# Patient Record
Sex: FEMALE | Race: BLACK OR AFRICAN AMERICAN | Employment: FULL TIME | URBAN - METROPOLITAN AREA
[De-identification: names, ages, dates, MRNs, and addresses within clinical notes are randomized per-mention and may not be internally consistent; named-entity substitution may affect disease eponyms.]

---

## 2018-02-21 ENCOUNTER — HOSPITAL ENCOUNTER (EMERGENCY)
Age: 28
Discharge: HOME | End: 2018-02-21
Attending: EMERGENCY MEDICINE | Admitting: EMERGENCY MEDICINE
Payer: COMMERCIAL

## 2018-02-21 VITALS
TEMPERATURE: 98.1 F | SYSTOLIC BLOOD PRESSURE: 114 MMHG | HEART RATE: 71 BPM | OXYGEN SATURATION: 97 % | DIASTOLIC BLOOD PRESSURE: 59 MMHG | WEIGHT: 189.4 LBS | RESPIRATION RATE: 15 BRPM | HEIGHT: 63 IN | BODY MASS INDEX: 33.56 KG/M2

## 2018-02-21 DIAGNOSIS — L73.9 FOLLICULITIS: Primary | ICD-10-CM

## 2018-02-21 PROCEDURE — 99282 EMERGENCY DEPT VISIT SF MDM: CPT

## 2018-02-21 RX ORDER — MUPIROCIN 20 MG/G
OINTMENT TOPICAL 3 TIMES DAILY
Qty: 22 G | Refills: 0 | Status: SHIPPED | OUTPATIENT
Start: 2018-02-21 | End: 2018-03-03

## 2018-02-21 NOTE — LETTER
[facility] EMERGENCY DEPT 
[address] [facility] [address] [phone] Work/School Note Date: 2/21/2018 To Whom It May concern: 
 
Imelda Duran was seen and treated today in the emergency room by the following provider(s): 
Attending Provider: Arabella Pinedo MD. Imelda Duran {Return to school/sport/work:[id number]} Sincerely, 
 
 
 
 
Kesha Lopez RN

## 2018-02-21 NOTE — LETTER
[facility] EMERGENCY DEPT 
[address] [facility] [address] [phone] Work/School Note Date: 2/21/2018 To Whom It May concern: 
 
Manda Kearney was seen and treated today in the emergency room by the following provider(s): 
Attending Provider: Dawna Robert MD. Manda Kearney may return to work on 2/21/18. Sincerely, Ivett Hernadez MD

## 2018-02-21 NOTE — ED NOTES
Work excuse note clarified with dr Harsh Razo md-pt to return tomorrow (after 24hrs of abx)-work note updated. .. Héctor Anaya Puls Discharge summary and discharge medications reviewed with patient and appropriate educational materials and side effects teaching were provided. patient  Given 1 paper prescriptions and 0 electronic prescriptions sent to pt's listed pharmacy. Patient (s) verbalized understanding of the importance of discussing medications with his or her physician or clinic they will be following up with. No si/s of acute distress prior to discharge. Patient offered wheelchair from treatment area to hospital entrance, patient refused wheelchair. Denied pain. Pt given work excuse note. ..  Emergency Department Nursing Plan of Care       The Nursing Plan of Care is developed from the Nursing assessment and Emergency Department Attending provider initial evaluation. The plan of care may be reviewed in the ED Provider note.     The Plan of Care was developed with the following considerations:   Patient / Family readiness to learn indicated by:verbalized understanding and appropriate questions asked  Persons(s) to be included in education: patient  Barriers to Learning/Limitations:No    Signed     Elie Essex, RN    2/21/2018   1:28 PM

## 2018-02-21 NOTE — ED PROVIDER NOTES
EMERGENCY DEPARTMENT HISTORY AND PHYSICAL EXAM      Date: 2/21/2018  Patient Name: Artur Miner    History of Presenting Illness     Chief Complaint   Patient presents with    Rash     x3 days to face and neck. [employer] team members have staph, rash going around        History Provided By: Patient    HPI: Artur Miner, 32 y.o. female with no significant PMHx, presents ambulatory to the ED with cc of a rash which developed on her cheek and posterior neck which started 3 days ago. 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 She reports mild associated pain. Her pain is described as a soreness. Pt is currently on birth control. She denies allergies to medications. Pt is otherwise without complaint. PCP: Not On File Bsh[facility]    There are no other complaints, changes, or physical findings at this time. Current Outpatient Prescriptions   Medication Sig Dispense Refill    mupirocin (BACTROBAN) 2 % ointment Apply  to affected area three (3) times daily for 10 days. Apply to affected area 22 g 0    ondansetron (ZOFRAN ODT) 4 mg disintegrating tablet Take 1 Tab by mouth every eight (8) hours as needed for Nausea for up to 6 doses. 6 Tab 0    loperamide (IMODIUM A-D) 2 mg tablet Take 1 Tab by mouth three (3) times daily as needed for Diarrhea for up to 6 doses. 6 Tab 0       Past History     Past Medical History:  No past medical history on file. Past Surgical History:  No past surgical history on file. Family History:  No family history on file. Social History:  Social History   Substance Use Topics    Smoking status: Never Smoker    Smokeless tobacco: Not on file    Alcohol use No       Allergies:  No Known Allergies      Review of Systems   Review of Systems   Constitutional: Negative for fever. HENT: Negative for sore throat. Eyes: Negative for photophobia and redness. Respiratory: Negative for shortness of breath and wheezing.     Cardiovascular: Negative for chest pain and leg swelling. Gastrointestinal: Negative for abdominal pain, blood in stool, nausea and vomiting. Genitourinary: Negative for difficulty urinating, dysuria, hematuria, menstrual problem and vaginal bleeding. Musculoskeletal: Negative for back pain and joint swelling. Skin: Positive for rash (face and neck). Neurological: Negative for dizziness, seizures, syncope, speech difficulty, weakness, numbness and headaches. Hematological: Negative for adenopathy. Psychiatric/Behavioral: Negative for agitation, confusion and suicidal ideas. The patient is not nervous/anxious. Physical Exam   Physical Exam   Constitutional: She is oriented to person, place, and time. She appears well-developed and well-nourished. No distress. HENT:   Head: Normocephalic and atraumatic. Mouth/Throat: Oropharynx is clear and moist. No oropharyngeal exudate. Eyes: Conjunctivae and EOM are normal. Pupils are equal, round, and reactive to light. Left eye exhibits no discharge. Neck: Normal range of motion. Neck supple. No JVD present. Cardiovascular: Normal rate, regular rhythm, normal heart sounds and intact distal pulses. Pulmonary/Chest: Effort normal and breath sounds normal. No respiratory distress. She has no wheezes. Abdominal: Soft. Bowel sounds are normal. She exhibits no distension. There is no tenderness. There is no rebound and no guarding. Musculoskeletal: Normal range of motion. She exhibits no edema or tenderness. Lymphadenopathy:     She has no cervical adenopathy. Neurological: She is alert and oriented to person, place, and time. She has normal reflexes. No cranial nerve deficit. Skin: Skin is warm and dry. Rash noted. Pustular rash on cheek and posterior neck. Psychiatric: She has a normal mood and affect. Her behavior is normal.   Nursing note and vitals reviewed. Medical Decision Making   I am the first provider for this patient.     I reviewed the vital signs, available nursing notes, past medical history, past surgical history, family history and social history. Vital Signs-Reviewed the patient's vital signs. Patient Vitals for the past 12 hrs:   Temp Pulse Resp BP SpO2   02/21/18 1256 98.1 °F (36.7 °C) 71 15 114/59 97 %     Records Reviewed: Nursing Notes and Old Medical Records    Provider Notes (Medical Decision Making):   DDx: folliculitis, skin infection     ED Course:   Initial assessment performed. The patients presenting problems have been discussed, and they are in agreement with the care plan formulated and outlined with them. I have encouraged them to ask questions as they arise throughout their visit. Disposition:  DISCHARGE NOTE  1:10 PM  The patient has been re-evaluated and is ready for discharge. Reviewed available results with patient. Counseled patient on diagnosis and care plan. Patient has expressed understanding, and all questions have been answered. Patient agrees with plan and agrees to follow up as recommended, or return to the ED if their symptoms worsen. Discharge instructions have been provided and explained to the patient, along with reasons to return to the ED. PLAN:  1. Current Discharge Medication List      START taking these medications    Details   mupirocin (BACTROBAN) 2 % ointment Apply  to affected area three (3) times daily for 10 days. Apply to affected area  Qty: 22 g, Refills: 0           2. Follow-up Information     Follow up With Details Comments Contact Info    Not On File Bshsi   Not On File (62) Patient has a PCP but that physician is not listed in [address] [place] [address]. [facility] EMERGENCY DEPT In 1 week  [facility]  [phone]        Return to ED if worse     Diagnosis     Clinical Impression:   1. Folliculitis        Attestations:    Attestation Note:  This note is prepared by ROBIN Arevalo, acting as Scribe for MD Ivett Bryan MD: The scribe's documentation has been prepared under my direction and personally reviewed by me in its entirety. I confirm that the note above accurately reflects all work, treatment, procedures, and medical decision making performed by me.

## 2018-02-21 NOTE — DISCHARGE INSTRUCTIONS
Folliculitis: Care Instructions  Your Care Instructions    Folliculitis (say \"voh-YKE-rzz-LY-tus\") is an infection of the pouches (follicles) in the skin where hair grows. It can occur on any part of the body, but it is most common on the scalp, face, armpits, and groin. Bacteria, such as those found in a hot tub, can cause folliculitis. Folliculitis begins as a red, tender area near a strand of hair. The skin can itch or burn and may drain pus or blood. Sometimes folliculitis can lead to more serious skin infections. Your doctor usually can treat mild folliculitis with an antibiotic cream or ointment. If you have folliculitis on your scalp, you may use a shampoo that kills bacteria. Antibiotics you take as pills can treat infections deeper in the skin. For stubborn cases of folliculitis, laser treatment may be an option. Laser treatment uses strong beams of light to destroy the hair follicle. But hair will no longer grow in the treated area. Follow-up care is a key part of your treatment and safety. Be sure to make and go to all appointments, and call your doctor if you are having problems. It's also a good idea to know your test results and keep a list of the medicines you take. How can you care for yourself at home? · Take your medicine exactly as prescribed. If your doctor prescribed antibiotics, take them as directed. Do not stop taking them just because you feel better. You need to take the full course of antibiotics. · Use a soap that kills bacteria to wash the infected area. If your scalp or beard is infected, use a shampoo with selenium or propylene glycol. Be careful. Do not scrub too long or too hard. · Mix 1 1/3 cup warm water and 1 tablespoon vinegar. Soak a cloth in the mixture, and place it over the infected skin until it cools off (usually 5 to 10 minutes). You can do this 3 to 6 times a day. · Do not share your razor, towel, or washcloth. That can spread folliculitis.   · Use a new blade in your razor each time you shave to keep from re-infecting your skin. · If you tend to get folliculitis, avoid using hot tubs. They can contain bacteria that cause folliculitis. When should you call for help? Call your doctor now or seek immediate medical care if:  ? · You have symptoms of infection, such as:  ¨ Increased pain, swelling, warmth, or redness. ¨ Red streaks leading from the area. ¨ Pus draining from the area. ¨ A fever. ? Watch closely for changes in your health, and be sure to contact your doctor if:  ? · You do not get better as expected. Where can you learn more? Go to http://jer-codey.info/. Enter M257 in the search box to learn more about \"Folliculitis: Care Instructions. \"  Current as of: October 13, 2016  Content Version: 11.4  © 8324-0249 [employer]. Care instructions adapted under license by [employer] (which disclaims liability or warranty for this information). If you have questions about a medical condition or this instruction, always ask your healthcare professional. [address] any warranty or liability for your use of this information.

## 2019-03-28 ENCOUNTER — OFFICE VISIT (OUTPATIENT)
Dept: INTERNAL MEDICINE CLINIC | Age: 29
End: 2019-03-28

## 2019-03-28 VITALS
BODY MASS INDEX: 43.05 KG/M2 | HEART RATE: 82 BPM | TEMPERATURE: 97.9 F | HEIGHT: 63 IN | DIASTOLIC BLOOD PRESSURE: 71 MMHG | WEIGHT: 243 LBS | OXYGEN SATURATION: 95 % | RESPIRATION RATE: 17 BRPM | SYSTOLIC BLOOD PRESSURE: 117 MMHG

## 2019-03-28 DIAGNOSIS — Z00.00 ADULT GENERAL MEDICAL EXAMINATION: Primary | ICD-10-CM

## 2019-03-28 DIAGNOSIS — N91.2 AMENORRHEA: ICD-10-CM

## 2019-03-28 DIAGNOSIS — Z13.228 SCREENING FOR ENDOCRINE, NUTRITIONAL, METABOLIC AND IMMUNITY DISORDER: ICD-10-CM

## 2019-03-28 DIAGNOSIS — Z13.29 SCREENING FOR ENDOCRINE, NUTRITIONAL, METABOLIC AND IMMUNITY DISORDER: ICD-10-CM

## 2019-03-28 DIAGNOSIS — F32.1 DEPRESSION, MAJOR, SINGLE EPISODE, MODERATE (HCC): ICD-10-CM

## 2019-03-28 DIAGNOSIS — N92.6 MENSTRUAL IRREGULARITY: ICD-10-CM

## 2019-03-28 DIAGNOSIS — R20.2 PARESTHESIA: ICD-10-CM

## 2019-03-28 DIAGNOSIS — Z13.21 SCREENING FOR ENDOCRINE, NUTRITIONAL, METABOLIC AND IMMUNITY DISORDER: ICD-10-CM

## 2019-03-28 DIAGNOSIS — Z11.3 SCREENING EXAMINATION FOR STD (SEXUALLY TRANSMITTED DISEASE): ICD-10-CM

## 2019-03-28 DIAGNOSIS — Z13.0 SCREENING FOR ENDOCRINE, NUTRITIONAL, METABOLIC AND IMMUNITY DISORDER: ICD-10-CM

## 2019-03-28 DIAGNOSIS — Z13.220 SCREENING FOR LIPID DISORDERS: ICD-10-CM

## 2019-03-28 DIAGNOSIS — E66.01 OBESITY, MORBID (HCC): ICD-10-CM

## 2019-03-28 NOTE — PROGRESS NOTES
Pt is here for Chief Complaint Patient presents with  New Patient  
  herer to establish care  Complete Physical  
 Depression Pt denies pain at this time 1. Have you been to the ER, urgent care clinic since your last visit? Hospitalized since your last visit? No 
 
2. Have you seen or consulted any other health care providers outside of the [address] [place] [address] since your last visit? Include any pap smears or colon screening.  No

## 2019-03-28 NOTE — PROGRESS NOTES
Romain Ledesma is a 29 y.o. female and presents with New Patient (herer to establish care); Complete Physical; and Depression Subjective: 
Romain Ledesma is a 29 y.o. female presenting for annual checkup and to establish care. ROS: Feeling well. No dyspnea or chest pain on exertion. No abdominal pain, change in bowel habits, black or bloody stools. No urinary tract or prostatic symptoms. No neurological complaints. Specific concerns today: mood disorder. Menstrual irregularity, LMP December 2018, had Norplant with spotting, then used NuvaRing x 1 month in December to help stop bleeding. Discussed ADVANCED DIRECTIVE:yes Advanced Directive on File: no 
Last BM: yesterday, Crisp# 4. Averages 5 BMs every 7 days. Dental exam in past 12 months: no 
Eye exam in past 12-24 months: yes Patient is seen for new onset of depression symptoms for past few months. Feels family is acting strange, everyone is strange, and she is NOT working. No treatments currently. No psychiatry or psychotherapy care, prefers talk therapy. No treatments tried, staying to herself more, not hanging or doing much. The patient denies recurrent thoughts of death and suicidal thoughts without plan. The patient experiences the following side effects from the treatment: n/a. 
3 most recent PHQ Screens 3/28/2019 Little interest or pleasure in doing things More than half the days Feeling down, depressed, irritable, or hopeless Nearly every day Total Score PHQ 2 5 Trouble falling or staying asleep, or sleeping too much Nearly every day Feeling tired or having little energy Several days Poor appetite, weight loss, or overeating Nearly every day Feeling bad about yourself - or that you are a failure or have let yourself or your family down More than half the days Trouble concentrating on things such as school, work, reading, or watching TV More than half the days Moving or speaking so slowly that other people could have noticed; or the opposite being so fidgety that others notice Not at all Thoughts of being better off dead, or hurting yourself in some way Several days PHQ 9 Score 17 How difficult have these problems made it for you to do your work, take care of your home and get along with others Not difficult at all Review of Systems Constitutional: negative for fevers, chills, anorexia and weight loss Eyes:   negative for visual disturbance, drainage, and irritation ENT:   negative for tinnitus,sore throat,nasal congestion,ear pain,and hoarseness Respiratory:  negative for cough, hemoptysis, dyspnea, and wheezing CV:   negative for chest pain, palpitations, and lower extremity edema GI:   +intermittent diarrhea 3x weekly. negative for nausea, vomiting, abdominal pain, and melena Endo:               negative for polyuria,polydipsia,polyphagia, and heat intolerance Genitourinary: negative for frequency, urgency, dysuria, retention, and hematuria Integument:  negative for rash, ulcerations, and pruritus Hematologic:  negative for easy bruising and bleeding Musculoskel: +tingling and numbness to fingers and toes, advised to wear wrist splints with some relief. negative for muscle weakness,and joint pain/swelling Neurological:  negative for headaches, dizziness, vertigo,and memory/gait problems Behavl/Psych: negative for feelings of anxiety, suicide, and mood changes Past Medical History:  
Diagnosis Date  Depression History reviewed. No pertinent surgical history. Social History Socioeconomic History  Marital status: SINGLE Spouse name: Not on file  Number of children: Not on file  Years of education: Not on file  Highest education level: Not on file Tobacco Use  Smoking status: Never Smoker  Smokeless tobacco: Never Used Substance and Sexual Activity  Alcohol use: Yes Comment: on occ  Drug use:  No  
  Sexual activity: Yes Birth control/protection: None History reviewed. No pertinent family history. No Known Allergies Objective: 
Visit Vitals /71 (BP 1 Location: Right arm, BP Patient Position: Sitting) Pulse 82 Temp 97.9 °F (36.6 °C) (Oral) Resp 17 Ht 5' 3\" (1.6 m) Wt 243 lb (110.2 kg) LMP 12/12/2018 (Approximate) SpO2 95% BMI 43.05 kg/m² Wt Readings from Last 3 Encounters:  
03/28/19 243 lb (110.2 kg) 02/21/18 189 lb 6.4 oz (85.9 kg) 12/07/15 215 lb (97.5 kg) Physical Exam:  
General appearance - alert, well appearing, and in no distress. Mental status - A/O x 4, normal mood and flat affect. Poor eye contact most of visit with head down and headphones on during interview portion. Head/Eyes- AT/NC. CINTHYA, EOMI, corneas normal, no foreign bodies. Ears- TM intact bilaterally, no erythema or drainage. Nose- Septum midline, pink mucosa. Turbinates normal, no polyps or erythema. No sinus tenderness. Mouth/Throat - mucous membranes moist, pharynx normal without lesions. No tonsillar swelling or exudates. Neck -Supple ,normal CSP. FROM, non-tender. No adenopathy/thyromegaly. No JVD. Chest - CTA. Symmetric chest rise. No wheezing, rales or rhonchi. Heart - Normal rate, regular rhythm. Normal S1, S2. No MGR. Abdomen - Soft, obese, non-distended. Normoactive BS in all quadrants. NT, no mass, rebound, or HSM Ext- Radial, DP pulses, 2+ bilaterally. No pedal edema, clubbing, or cyanosis. Skin- Normal for ethnicity, warm, and dry. No hyperpigmentation, ulcerations, or suspicious lesions Neuro - Normal speech, no focal findings. Normal strength and muscle tone. Coordination and gait normal.   
CN II-XII intact. Cold and vibratory sensation intact. Normal DTR's. Assessment/Plan: I spent greater than 50% of 40 minute visit counseling patient about diagnostic results, impressions, prognosis, risk/benefits of treatment options, medication management and adequate follow-up, importance of adherence to treatment plan, and risk factor reduction. Discussed the patient's BMI with her. The BMI follow up plan is as follows: exercise 5 x weekly and eating 5 x daily. I have reviewed/discussed the above normal BMI (Body mass index is 43.05 kg/m².) with the patient. I have recommended the following interventions: encourage exercise, monitor weight, and dietary management education, guidance, and counseling, . Medication Side Effects and Warnings were discussed with patient: yes Patient Labs were reviewed: yes Patient Past Records were reviewed: yes See orders below Pt has given consent verbally while in office for [employer]. ICD-10-CM ICD-9-CM 1. Adult general medical examination R30.86 Y81.1 METABOLIC PANEL, COMPREHENSIVE  
   CBC WITH AUTOMATED DIFF  
   HEMOGLOBIN A1C WITH EAG  
   LIPID PANEL  
   TSH 3RD GENERATION 2. Obesity, morbid ([facility] [address] E66.01 278.01 HEMOGLOBIN A1C WITH EAG 3. Screening for lipid disorders Z13.220 V77.91 LIPID PANEL 4. Screening for endocrine, nutritional, metabolic and immunity disorder J64.70 Z16.07 METABOLIC PANEL, COMPREHENSIVE  
 Z13.21  CBC WITH AUTOMATED DIFF  
 Z13.228  HEMOGLOBIN A1C WITH EAG  
 Z13.0  TSH 3RD GENERATION 5. Depression, major, single episode, moderate (HCC) F32.1 296.22 CT/NG/T.VAGINALIS AMPLIFICATION  
   REFERRAL TO PSYCHOLOGY 6. Paresthesia R20.2 782.0 MAGNESIUM 7. Amenorrhea N91.2 626.0 AMB POC URINE PREGNANCY TEST, VISUAL COLOR COMPARISON 8. Screening examination for STD (sexually transmitted disease) Z11.3 V74.5 9. Menstrual irregularity N92.6 626.4 Orders Placed This Encounter  METABOLIC PANEL, COMPREHENSIVE  
 CBC WITH AUTOMATED DIFF  
 HEMOGLOBIN A1C WITH EAG  
 LIPID PANEL  
 TSH 3RD GENERATION  
 CT/NG/T.VAGINALIS AMPLIFICATION Order Specific Question:   Specimen type Answer:   Urine [258]  MAGNESIUM  
  REFERRAL TO PSYCHOLOGY Referral Priority:   Routine Referral Type:   Behavioral Health Referral Reason:   Specialty Services Required Referred to Provider:   Tani Parra LCSW Number of Visits Requested:   1  AMB POC URINE PREGNANCY TEST, VISUAL COLOR COMPARISON Follow-up and Dispositions · Return in about 1 month (around 4/25/2019) for mdd, lab review. Vicky Renteria expressed understanding of plan. An After Visit Summary was offered/printed and given to the patient.

## 2019-03-28 NOTE — PATIENT INSTRUCTIONS
Eat Breakfast DAILY within 30-60 minutes of WAKING and AIM to eat at least 5 TIMES DAILY, 3 meals and 2 snacks, about 2-3 hours apart. Aim to drink 122 OUNCES of water DAILY. Equal to HALF of your bodyweight. May add FRUIT, cucumbers, lemon, etc. For flavoring. Avoid SODA and JUICE. Start walking 3 TIMES WEEKLY for 20 minutes, as you get more comfortable increase your PACE and then the amount of TIME. The goal is 5 days weekly for 30 minutes. Also look up THE FAST METABOLISM DIET on [employer] by Destini Plascencia to review. There is also an FARIHA. Learning About Positive Thinking What is positive thinking? Positive thinking, or healthy thinking, is a way to help you stay well or cope with a health problem by changing how you think. It's based on research that shows that you can change how you think. And how you think affects how you feel. Cognitive-behavioral therapy, also called CBT, is a therapy that is often used to help people think in a healthy way. It focuses on thought (cognitive) and action (behavioral). How can positive thinking help you? If you think in a positive way, you may be more able to care for yourself and handle life's challenges. You will feel better. And you may be more able to avoid or cope with stress, anxiety, and depression. CBT may be able to help you sleep better and lose weight. How can you get started with positive thinking? CBT involves techniques that you can practice every day so that healthy thinking comes naturally. Here are the steps for one technique. 1. Stop. When you notice a negative thought, stop it in its tracks and write it down. 2. Ask. Look at that thought and ask yourself whether it is helpful or unhelpful right now. 3. Choose. Choose a new, helpful thought to replace a negative one. Here's an example of how this might work: 
· In a job review, your boss praised several things about your work.  But you're feeling down because she had one small criticism. You might even think, \"I'm no good at my job\" or \"She doesn't like me. I must be bad. \" These are negative thoughts. You want to stop them. · Ask yourself questions about the situation and your negative thoughts. You might ask, \"What did my boss say exactly? \" \"Were there positive comments? \" \"Why do I focus only on one criticism? \" Your answers can help you find more accurate and helpful statements. · Now choose a helpful thought to replace the negative thoughts. For example, you might think, \"I've done a lot of good work this year, and my boss noticed it. She thought there was one area I can improve. So I'll think of some things I can do to get stronger in that area. \" With time and practice, you can learn to see that the harsh things you say to yourself may keep you from enjoying your life and work. You can replace them with more helpful thoughts. Where can you learn more? Go to http://jer-codey.info/. Enter D488 in the search box to learn more about \"Learning About Positive Thinking. \" Current as of: September 11, 2018 Content Version: 11.9 © 6866-0699 [employer]. Care instructions adapted under license by [employer] (which disclaims liability or warranty for this information). If you have questions about a medical condition or this instruction, always ask your healthcare professional. [place] [address] any warranty or liability for your use of this information. Advance Directives: Care Instructions Your Care Instructions An advance directive is a legal way to state your wishes at the end of your life. It tells your family and your doctor what to do if you can no longer say what you want. There are two main types of advance directives. You can change them any time that your wishes change.  
· A living will tells your family and your doctor your wishes about life support and other treatment. · A durable power of  for health care lets you name a person to make treatment decisions for you when you can't speak for yourself. This person is called a health care agent. If you do not have an advance directive, decisions about your medical care may be made by a doctor or a  who doesn't know you. It may help to think of an advance directive as a gift to the people who care for you. If you have one, they won't have to make tough decisions by themselves. Follow-up care is a key part of your treatment and safety. Be sure to make and go to all appointments, and call your doctor if you are having problems. It's also a good idea to know your test results and keep a list of the medicines you take. How can you care for yourself at home? · Discuss your wishes with your loved ones and your doctor. This way, there are no surprises. · Many states have a unique form. Or you might use a universal form that has been approved by many states. This kind of form can sometimes be completed and stored online. Your electronic copy will then be available wherever you have a connection to the Internet. In most cases, doctors will respect your wishes even if you have a form from a different state. · You don't need a  to do an advance directive. But you may want to get legal advice. · Think about these questions when you prepare an advance directive: 
? Who do you want to make decisions about your medical care if you are not able to? Many people choose a family member or close friend. ? Do you know enough about life support methods that might be used? If not, talk to your doctor so you understand. ? What are you most afraid of that might happen? You might be afraid of having pain, losing your independence, or being kept alive by machines. ? Where would you prefer to die? Choices include your home, a hospital, or a nursing home. ? Would you like to have information about hospice care to support you and your family? ? Do you want to donate organs when you die? ? Do you want certain Zoroastrian practices performed before you die? If so, put your wishes in the advance directive. · Read your advance directive every year, and make changes as needed. When should you call for help? Be sure to contact your doctor if you have any questions. Where can you learn more? Go to http://jer-codey.info/. Enter R264 in the search box to learn more about \"Advance Directives: Care Instructions. \" Current as of: April 18, 2018 Content Version: 11.9 © 3568-5382 [employer]. Care instructions adapted under license by [employer] (which disclaims liability or warranty for this information). If you have questions about a medical condition or this instruction, always ask your healthcare professional. [address] any warranty or liability for your use of this information.

## 2020-05-26 ENCOUNTER — HOSPITAL ENCOUNTER (INPATIENT)
Age: 30
LOS: 1 days | Discharge: HOME | DRG: 751 | End: 2020-05-27
Attending: EMERGENCY MEDICINE | Admitting: PSYCHIATRY & NEUROLOGY
Payer: MEDICAID

## 2020-05-26 DIAGNOSIS — F20.0 PARANOID SCHIZOPHRENIA (HCC): Primary | ICD-10-CM

## 2020-05-26 DIAGNOSIS — Z00.8 MEDICAL CLEARANCE FOR PSYCHIATRIC ADMISSION: ICD-10-CM

## 2020-05-26 PROBLEM — F33.3 DEPRESSION, MAJOR, RECURRENT, SEVERE WITH PSYCHOSIS (HCC): Status: ACTIVE | Noted: 2020-05-26

## 2020-05-26 LAB
ALBUMIN SERPL-MCNC: 3.9 G/DL (ref 3.5–5)
ALBUMIN/GLOB SERPL: 1.1 {RATIO} (ref 1.1–2.2)
ALP SERPL-CCNC: 53 U/L (ref 45–117)
ALT SERPL-CCNC: 17 U/L (ref 12–78)
AMPHET UR QL SCN: NEGATIVE
ANION GAP SERPL CALC-SCNC: 7 MMOL/L (ref 5–15)
APPEARANCE UR: CLEAR
AST SERPL-CCNC: 14 U/L (ref 15–37)
BARBITURATES UR QL SCN: NEGATIVE
BASOPHILS # BLD: 0 K/UL (ref 0–0.1)
BASOPHILS NFR BLD: 0 % (ref 0–1)
BENZODIAZ UR QL: NEGATIVE
BILIRUB SERPL-MCNC: 0.3 MG/DL (ref 0.2–1)
BILIRUB UR QL: NEGATIVE
BUN SERPL-MCNC: 10 MG/DL (ref 6–20)
BUN/CREAT SERPL: 10 (ref 12–20)
CALCIUM SERPL-MCNC: 8.9 MG/DL (ref 8.5–10.1)
CANNABINOIDS UR QL SCN: NEGATIVE
CHLORIDE SERPL-SCNC: 103 MMOL/L (ref 97–108)
CO2 SERPL-SCNC: 30 MMOL/L (ref 21–32)
COCAINE UR QL SCN: NEGATIVE
COLOR UR: NORMAL
CREAT SERPL-MCNC: 1.02 MG/DL (ref 0.55–1.02)
DIFFERENTIAL METHOD BLD: NORMAL
DRUG SCRN COMMENT,DRGCM: NORMAL
EOSINOPHIL # BLD: 0.1 K/UL (ref 0–0.4)
EOSINOPHIL NFR BLD: 2 % (ref 0–7)
ERYTHROCYTE [DISTWIDTH] IN BLOOD BY AUTOMATED COUNT: 13.8 % (ref 11.5–14.5)
ETHANOL SERPL-MCNC: <10 MG/DL
GLOBULIN SER CALC-MCNC: 3.6 G/DL (ref 2–4)
GLUCOSE SERPL-MCNC: 85 MG/DL (ref 65–100)
GLUCOSE UR STRIP.AUTO-MCNC: NEGATIVE MG/DL
HCG UR QL: NEGATIVE
HCT VFR BLD AUTO: 39.4 % (ref 35–47)
HGB BLD-MCNC: 12.4 G/DL (ref 11.5–16)
HGB UR QL STRIP: NEGATIVE
IMM GRANULOCYTES # BLD AUTO: 0 K/UL (ref 0–0.04)
IMM GRANULOCYTES NFR BLD AUTO: 0 % (ref 0–0.5)
KETONES UR QL STRIP.AUTO: NEGATIVE MG/DL
LEUKOCYTE ESTERASE UR QL STRIP.AUTO: NEGATIVE
LYMPHOCYTES # BLD: 2.9 K/UL (ref 0.8–3.5)
LYMPHOCYTES NFR BLD: 42 % (ref 12–49)
MCH RBC QN AUTO: 26.3 PG (ref 26–34)
MCHC RBC AUTO-ENTMCNC: 31.5 G/DL (ref 30–36.5)
MCV RBC AUTO: 83.7 FL (ref 80–99)
METHADONE UR QL: NEGATIVE
MONOCYTES # BLD: 0.5 K/UL (ref 0–1)
MONOCYTES NFR BLD: 8 % (ref 5–13)
NEUTS SEG # BLD: 3.4 K/UL (ref 1.8–8)
NEUTS SEG NFR BLD: 48 % (ref 32–75)
NITRITE UR QL STRIP.AUTO: NEGATIVE
NRBC # BLD: 0 K/UL (ref 0–0.01)
NRBC BLD-RTO: 0 PER 100 WBC
OPIATES UR QL: NEGATIVE
PCP UR QL: NEGATIVE
PH UR STRIP: 6.5 [PH] (ref 5–8)
PLATELET # BLD AUTO: 246 K/UL (ref 150–400)
PMV BLD AUTO: 12 FL (ref 8.9–12.9)
POTASSIUM SERPL-SCNC: 3.6 MMOL/L (ref 3.5–5.1)
PROT SERPL-MCNC: 7.5 G/DL (ref 6.4–8.2)
PROT UR STRIP-MCNC: NEGATIVE MG/DL
RBC # BLD AUTO: 4.71 M/UL (ref 3.8–5.2)
SODIUM SERPL-SCNC: 140 MMOL/L (ref 136–145)
SP GR UR REFRACTOMETRY: <1.005 (ref 1–1.03)
UROBILINOGEN UR QL STRIP.AUTO: 0.2 EU/DL (ref 0.2–1)
WBC # BLD AUTO: 7 K/UL (ref 3.6–11)

## 2020-05-26 PROCEDURE — 81003 URINALYSIS AUTO W/O SCOPE: CPT

## 2020-05-26 PROCEDURE — 36415 COLL VENOUS BLD VENIPUNCTURE: CPT

## 2020-05-26 PROCEDURE — 85025 COMPLETE CBC W/AUTO DIFF WBC: CPT

## 2020-05-26 PROCEDURE — 80053 COMPREHEN METABOLIC PANEL: CPT

## 2020-05-26 PROCEDURE — 90791 PSYCH DIAGNOSTIC EVALUATION: CPT

## 2020-05-26 PROCEDURE — 80307 DRUG TEST PRSMV CHEM ANLYZR: CPT

## 2020-05-26 PROCEDURE — 99284 EMERGENCY DEPT VISIT MOD MDM: CPT

## 2020-05-26 PROCEDURE — 65220000003 HC RM SEMIPRIVATE PSYCH

## 2020-05-26 PROCEDURE — 81025 URINE PREGNANCY TEST: CPT

## 2020-05-26 RX ORDER — DIPHENHYDRAMINE HYDROCHLORIDE 50 MG/ML
50 INJECTION, SOLUTION INTRAMUSCULAR; INTRAVENOUS
Status: DISCONTINUED | OUTPATIENT
Start: 2020-05-26 | End: 2020-05-27 | Stop reason: HOSPADM

## 2020-05-26 RX ORDER — HYDROXYZINE 25 MG/1
50 TABLET, FILM COATED ORAL
Status: DISCONTINUED | OUTPATIENT
Start: 2020-05-26 | End: 2020-05-27 | Stop reason: HOSPADM

## 2020-05-26 RX ORDER — BENZTROPINE MESYLATE 1 MG/1
1 TABLET ORAL
Status: DISCONTINUED | OUTPATIENT
Start: 2020-05-26 | End: 2020-05-27 | Stop reason: HOSPADM

## 2020-05-26 RX ORDER — ACETAMINOPHEN 325 MG/1
650 TABLET ORAL
Status: DISCONTINUED | OUTPATIENT
Start: 2020-05-26 | End: 2020-05-27 | Stop reason: HOSPADM

## 2020-05-26 RX ORDER — ADHESIVE BANDAGE
30 BANDAGE TOPICAL DAILY PRN
Status: DISCONTINUED | OUTPATIENT
Start: 2020-05-26 | End: 2020-05-27 | Stop reason: HOSPADM

## 2020-05-26 RX ORDER — HALOPERIDOL 5 MG/ML
5 INJECTION INTRAMUSCULAR
Status: DISCONTINUED | OUTPATIENT
Start: 2020-05-26 | End: 2020-05-27 | Stop reason: HOSPADM

## 2020-05-26 RX ORDER — LORAZEPAM 1 MG/1
1 TABLET ORAL
Status: DISPENSED | OUTPATIENT
Start: 2020-05-26 | End: 2020-05-26

## 2020-05-26 RX ORDER — LORAZEPAM 2 MG/ML
1 INJECTION INTRAMUSCULAR
Status: DISCONTINUED | OUTPATIENT
Start: 2020-05-26 | End: 2020-05-27 | Stop reason: HOSPADM

## 2020-05-26 RX ORDER — OLANZAPINE 5 MG/1
5 TABLET ORAL
Status: DISCONTINUED | OUTPATIENT
Start: 2020-05-26 | End: 2020-05-27 | Stop reason: HOSPADM

## 2020-05-26 RX ORDER — TRAZODONE HYDROCHLORIDE 50 MG/1
50 TABLET ORAL
Status: DISCONTINUED | OUTPATIENT
Start: 2020-05-26 | End: 2020-05-27 | Stop reason: HOSPADM
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
## 2020-05-26 NOTE — ED NOTES
TRANSFER - OUT REPORT:    Verbal report given to Mag Roberts RN(name) on Pito Prater  being transferred to behavioral health (unit) for routine progression of care       Report consisted of patients Situation, Background, Assessment and   Recommendations(SBAR). Information from the following report(s) SBAR, ED Summary, [facility] and Recent Results was reviewed with the receiving nurse. Lines:       Opportunity for questions and clarification was provided.       Patient transported with:  Jerson Sanchez

## 2020-05-26 NOTE — ED TRIAGE NOTES
Patient presents to the ED with c/o mental health problem. Pt states \"my mother made me come she said I was crazy. \" pt denies SI or HI. Pt states \"everyone is against me, they are recording me. I am being baited and manipulated because they are trying to make me mad. \" Pt denies ever being admitted. Pt is alert and oriented. Pt skin is warm and dry. Pt is ambulatory independently. Pt is calm and cooperative. Pt paranoid. Pt not making eye contact when speaking. Emergency Department Nursing Plan of Care       The Nursing Plan of Care is developed from the Nursing assessment and Emergency Department Attending provider initial evaluation. The plan of care may be reviewed in the ED Provider note.     The Plan of Care was developed with the following considerations:   Patient / Family readiness to learn indicated by:verbalized understanding  Persons(s) to be included in education: patient  Barriers to Learning/Limitations:No    Signed     Andrei Leon    5/26/2020   9:59 AM

## 2020-05-26 NOTE — BSMART NOTE
Comprehensive Assessment Form Part 1 Section I - Disposition Axis I - Schizophrenia, paranoid type VS Schizoaffective d/o Rule out Agoraphobia Major Depression, single episode by hx (3/28/19) Axis II - deferred Axis III - Paresthesia, Amenorrhea by hx Axis IV - relational problems with mother and sister with whom she lives, reluctant to pursue out-patient mental health services [place] [facility] - 49 The Medical Doctor to Psychiatrist conference was not completed. Medical doctor is in agreement with this counselor's assessment and plan of care. The plan is to admit to [facility] [facility] Bed 1. The physician consulted was Dr. Raul Car. The admitting Psychiatrist will be Dr. Bella Cervantes. The admitting Diagnosis is Schizophrenia, paranoid type The Payor source is SELF PAY - [facility] SELF PAY. Section II - Integrated Summary Summary:   
Patient is a 35 yo black female who arrives at ED with chief complaint saying, \"My mother made me come. She said I was crazy. \"  Patient states, \"Everyone is against me. They are recording me. I am being baited and manipulated because they are trying to make me mad. \" Patient has no previous psych history and is not prescribed any psych medications. She presents as depressed, withdrawn, and constantly fidgeting with and looking at her ED wristband. Patient seems to be preoccupied by internal stimuli. She states she cannot distinguish between whether she is hearing voices or experiencing intrusive thoughts but agrees that they are impressing on her that Daryle Pear is against me; they're recording me; I'm being baited and manipulated. \" She reports seeing shadows but says she cannot distinguish what they look like or represent. She denies SI but then says, \"I don't care about anything. I wouldn't hurt myself but I wouldn't mind if someone hurt me. \" She also reports that she doesn't like crowds and tends to stay in her room most of the time.  Patient reports that all the above symptoms have been occurring over the past 2 years and denies any significant triggering or precipitating event(s). Patient denies homicidal ideation. She is oriented X4. Patient's ETOH is <10, drug screen is negative, and pregnancy test is negative. Thought process was linear but demonstrates some thought blocking. Patient's memory appears intact and fund of knowledge is adequate. Patient has no history of psych admissions, reports no previous suicide attempts, is not followed by a psychiatrist or counselor, or prescribed any psych medications. Patient vacillated between being amenable to a psych admission or not but eventually agreed that it would probably help her. In this counselor's opinion, as well as ED provider, patient does not meet criteria for a TDO at this time and is therefore free to return home if she chooses. Patient is respectful, compliant and without agitation or aggression. The patient has demonstrated mental capacity to provide informed consent. The information is given by the patient and past medical records. The Chief Complaint is paranoia, anxiety, depression. The Precipitant Factors are relational problems with mother and sister with whom she lives, reluctant to pursue out-patient mental health services. Previous Hospitalizations: none reported. The patient has not previously been in restraints. Current Psychiatrist and/or  is n/a. Lethality Assessment: 
 
The potential for suicide noted by the following: active psychosis . The potential for homicide is not noted. The patient has not been a perpetrator of sexual or physical abuse. There are not pending charges. The patient is not felt to be at risk for self harm or harm to others. The attending nurse was advised no further monitoring is necessary at this time. Section III - Psychosocial 
The patient's overall mood and attitude is depressed, withdrawn, paranoid. Feelings of helplessness and hopelessness are not observed. Generalized anxiety is not observed. Panic is not observed. Phobias are not observed. Obsessive compulsive tendencies are not observed. Section IV - Mental Status Exam 
The patient's appearance shows no evidence of impairment. The patient's behavior is guarded and shows poor eye contact. The patient is oriented to time, place, person and situation. The patient's speech is soft. The patient's mood is depressed, is anxious, is withdrawn and displays anhedonia. The range of affect is flat. The patient's thought content demonstrates paranoia. The thought process is mild blocking. The patient's perception demonstrated changes in the following:  auditory  visual hallucinations. The patient's memory shows no evidence of impairment. The patient's appetite is increased and shows signs of weight gain. The patient's sleep shows no evidence of impairment. The patient's insight is blaming. The patient's judgement is psychologically impaired. Section V - Substance Abuse The patient is not using substances. Section VI - Living Arrangements The patient is single. The patient lives with a parent and sister. The patient has no children. The patient does plan to return home upon discharge. The patient does not have legal issues pending. The patient's source of income comes from family. Orthodoxy and cultural practices have not been voiced at this time. The patient's greatest support comes from mother and this person will be involved with the treatment. The patient has not been in an event described as horrible or outside the realm of ordinary life experience either currently or in the past. 
The patient has not been a victim of sexual/physical abuse. Section VII - Other Areas of Clinical Concern The highest grade achieved is 12th with the overall quality of school experience being described as ok. The patient is currently unemployed and speaks [place] as a primary language. The patient has no communication impairments affecting communication. The patient's preference for learning can be described as: can read and write adequately.   The patient's hearing is normal.  The patient's vision is normal. 
 
 
Mary Ramsey LPC

## 2020-05-26 NOTE — ED PROVIDER NOTES
EMERGENCY DEPARTMENT HISTORY AND PHYSICAL EXAM      Date: 5/26/2020  Patient Name: Miriam Galeana    History of Presenting Illness     Chief Complaint   Patient presents with   [address] Problem     History Provided By: Patient    HPI: Miriam Galeana, 34 y.o. female with no past medical history who presents via private vehicle to the ED with cc of abnormal thoughts and the feeling of being crazy. Patient states her mother told her that she needs to come get seen because she is not acting right. Patient does endorse depression and loneliness and believes that her mother and daughter are out to get her. She thinks they are recording her and is extremely paranoid. She cannot tell me how long this is been going on. She denies any visual or auditory hallucinations. She also denies any homicidal or suicidal thoughts. She does endorse depression and loneliness and has had thoughts of harming herself in the past, but denies them currently. She has never been diagnosed with any psychiatric disorder and has never been hospitalized in the past for any psychiatric events. PMHx: None  Social Hx: Occasional alcohol use, denies tobacco use, denies illegal drug use    PCP: Michael Fox NP    There are no other complaints, changes, or physical findings at this time. No current facility-administered medications on file prior to encounter. No current outpatient medications on file prior to encounter. Past History     Past Medical History:  Past Medical History:   Diagnosis Date    Depression      Past Surgical History:  History reviewed. No pertinent surgical history. Family History:  History reviewed. No pertinent family history.   Social History:  Social History     Tobacco Use    Smoking status: Never Smoker    Smokeless tobacco: Never Used   Substance Use Topics    Alcohol use: Yes     Comment: on occ    Drug use: No     Allergies:  No Known Allergies  Review of Systems   Review of Systems Constitutional: Negative for chills and fever. HENT: Negative for congestion, rhinorrhea, sneezing and sore throat. Eyes: Negative for redness and visual disturbance. Respiratory: Negative for shortness of breath. Cardiovascular: Negative for leg swelling. Gastrointestinal: Negative for abdominal pain, nausea and vomiting. Genitourinary: Negative for difficulty urinating and frequency. Musculoskeletal: Negative for back pain, myalgias and neck stiffness. Skin: Negative for rash. Neurological: Negative for dizziness, syncope, weakness and headaches. Hematological: Negative for adenopathy. Psychiatric/Behavioral: Positive for decreased concentration and dysphoric mood. Negative for hallucinations, self-injury and suicidal ideas. The patient is not nervous/anxious. All other systems reviewed and are negative. Physical Exam   Physical Exam  Vitals signs and nursing note reviewed. Constitutional:       Appearance: Normal appearance. She is well-developed. HENT:      Head: Normocephalic and atraumatic. Eyes:      Conjunctiva/sclera: Conjunctivae normal.   Neck:      Musculoskeletal: Full passive range of motion without pain, normal range of motion and neck supple. Cardiovascular:      Rate and Rhythm: Normal rate and regular rhythm. Pulses: Normal pulses. Heart sounds: Normal heart sounds, S1 normal and S2 normal. No murmur. Pulmonary:      Effort: Pulmonary effort is normal. No respiratory distress. Breath sounds: Normal breath sounds. No wheezing. Abdominal:      General: Bowel sounds are normal. There is no distension. Palpations: Abdomen is soft. Tenderness: There is no abdominal tenderness. There is no rebound. Musculoskeletal: Normal range of motion. Skin:     General: Skin is warm and dry. Findings: No rash. Neurological:      Mental Status: She is alert and oriented to person, place, and time.    Psychiatric:         Mood and Affect: Mood is depressed. Affect is blunt. Speech: Speech normal.         Behavior: Behavior normal.         Thought Content: Thought content is paranoid. Judgment: Judgment normal.      Comments: Avoids eye contact       Diagnostic Study Results   Labs -     Recent Results (from the past 12 hour(s))   ETHYL ALCOHOL    Collection Time: 05/26/20 10:28 AM   Result Value Ref Range    ALCOHOL(ETHYL),SERUM <10 <10 MG/DL   CBC WITH AUTOMATED DIFF    Collection Time: 05/26/20 10:28 AM   Result Value Ref Range    WBC 7.0 3.6 - 11.0 K/uL    RBC 4.71 3.80 - 5.20 M/uL    HGB 12.4 11.5 - 16.0 g/dL    HCT 39.4 35.0 - 47.0 %    MCV 83.7 80.0 - 99.0 FL    MCH 26.3 26.0 - 34.0 PG    MCHC 31.5 30.0 - 36.5 g/dL    RDW 13.8 11.5 - 14.5 %    PLATELET 014 146 - 694 K/uL    MPV 12.0 8.9 - 12.9 FL    NRBC 0.0 0  WBC    ABSOLUTE NRBC 0.00 0.00 - 0.01 K/uL    NEUTROPHILS 48 32 - 75 %    LYMPHOCYTES 42 12 - 49 %    MONOCYTES 8 5 - 13 %    EOSINOPHILS 2 0 - 7 %    BASOPHILS 0 0 - 1 %    IMMATURE GRANULOCYTES 0 0.0 - 0.5 %    ABS. NEUTROPHILS 3.4 1.8 - 8.0 K/UL    ABS. LYMPHOCYTES 2.9 0.8 - 3.5 K/UL    ABS. MONOCYTES 0.5 0.0 - 1.0 K/UL    ABS. EOSINOPHILS 0.1 0.0 - 0.4 K/UL    ABS. BASOPHILS 0.0 0.0 - 0.1 K/UL    ABS. IMM. GRANS. 0.0 0.00 - 0.04 K/UL    DF AUTOMATED     METABOLIC PANEL, COMPREHENSIVE    Collection Time: 05/26/20 10:28 AM   Result Value Ref Range    Sodium 140 136 - 145 mmol/L    Potassium 3.6 3.5 - 5.1 mmol/L    Chloride 103 97 - 108 mmol/L    CO2 30 21 - 32 mmol/L    Anion gap 7 5 - 15 mmol/L    Glucose 85 65 - 100 mg/dL    BUN 10 6 - 20 MG/DL    Creatinine 1.02 0.55 - 1.02 MG/DL    BUN/Creatinine ratio 10 (L) 12 - 20      GFR est AA >60 >60 ml/min/1.73m2    GFR est non-AA >60 >60 ml/min/1.73m2    Calcium 8.9 8.5 - 10.1 MG/DL    Bilirubin, total 0.3 0.2 - 1.0 MG/DL    ALT (SGPT) 17 12 - 78 U/L    AST (SGOT) 14 (L) 15 - 37 U/L    Alk.  phosphatase 53 45 - 117 U/L    Protein, total 7.5 6.4 - 8.2 g/dL    Albumin 3.9 3.5 - 5.0 g/dL    Globulin 3.6 2.0 - 4.0 g/dL    A-G Ratio 1.1 1.1 - 2.2     DRUG SCREEN, URINE    Collection Time: 05/26/20 10:28 AM   Result Value Ref Range    AMPHETAMINES Negative NEG      BARBITURATES Negative NEG      BENZODIAZEPINES Negative NEG      COCAINE Negative NEG      METHADONE Negative NEG      OPIATES Negative NEG      PCP(PHENCYCLIDINE) Negative NEG      THC (TH-CANNABINOL) Negative NEG      Drug screen comment (NOTE)    URINALYSIS W/ RFLX MICROSCOPIC    Collection Time: 05/26/20 10:28 AM   Result Value Ref Range    Color YELLOW/STRAW      Appearance CLEAR CLEAR      Specific gravity <1.005 1.003 - 1.030    pH (UA) 6.5 5.0 - 8.0      Protein Negative NEG mg/dL    Glucose Negative NEG mg/dL    Ketone Negative NEG mg/dL    Bilirubin Negative NEG      Blood Negative NEG      Urobilinogen 0.2 0.2 - 1.0 EU/dL    Nitrites Negative NEG      Leukocyte Esterase Negative NEG     HCG URINE, QL. - POC    Collection Time: 05/26/20 10:39 AM   Result Value Ref Range    Pregnancy test,urine (POC) Negative NEG         Radiologic Studies -   No orders to display     No results found. Medical Decision Making   I am the first provider for this patient. I reviewed the vital signs, available nursing notes, past medical history, past surgical history, family history and social history. Vital Signs-Reviewed the patient's vital signs. Patient Vitals for the past 24 hrs:   Temp Pulse Resp BP SpO2   05/26/20 0954 98 °F (36.7 °C) 60 18 152/84 100 %     Pulse Oximetry Analysis - 100% on RA    Records Reviewed: Nursing Notes    Provider Notes (Medical Decision Making):   77-year-old female presents with depression, paranoid thoughts, and the concern for an acute psychiatric event. She denies homicidal or suicidal ideations currently but does endorse having them in the past.  She avoids eye contact and does feel that her family is out to get her and states they have been recording her.   Suspect she has undiagnosed paranoid schizophrenia. Will discuss with [facility] to evaluate her and check labs for medical clearance. ED Course:   Initial assessment performed. The patients presenting problems have been discussed, and they are in agreement with the care plan formulated and outlined with them. I have encouraged them to ask questions as they arise throughout their visit. 10:39 AM  Juliette Lindsay MD spoke with Km Brumfield for [facility]. Discussed HPI and PE, available diagnostic tests and clinical findings. He is in agreement with care plans as outlined. He has seen and evaluated patient. He feels patient would benefit from admission to the hospital.    Progress Note  11:28 AM  Laurent Beckett informs me that patient has been accepted by Dr. Carpio Early to inpatient psychiatry. Progress Note:   Updated pt on all returned results and findings. Discussed the importance of proper follow up as referred below along with return precautions. Pt in agreement with the care plan and expresses agreement with and understanding of all items discussed. Disposition:  ADMIT NOTE:  11:40 AM  The patient is being admitted to the hospital by inpatient psychiatry. The results of their tests and reasons for their admission have been discussed with the patient and/or available family. They convey agreement and understanding for the need to be admitted and for their admission diagnosis. PLAN:  1. Admit    Diagnosis     Clinical Impression:   1. Paranoid schizophrenia ([address]    2. Medical clearance for psychiatric admission            Please note that this dictation was completed with Dragon, computer voice recognition software. Quite often unanticipated grammatical, syntax, homophones, and other interpretive errors are inadvertently transcribed by the computer software. Please disregard these errors. Additionally, please excuse any errors that have escaped final proofreading.

## 2020-05-27 VITALS
OXYGEN SATURATION: 100 % | RESPIRATION RATE: 16 BRPM | HEART RATE: 68 BPM | SYSTOLIC BLOOD PRESSURE: 137 MMHG | BODY MASS INDEX: 43.24 KG/M2 | TEMPERATURE: 97.7 F | HEIGHT: 62 IN | DIASTOLIC BLOOD PRESSURE: 85 MMHG | WEIGHT: 235 LBS

## 2020-05-27 RX ORDER — SERTRALINE HYDROCHLORIDE 50 MG/1
50 TABLET, FILM COATED ORAL DAILY
Status: DISCONTINUED | OUTPATIENT
Start: 2020-05-28 | End: 2020-05-27 | Stop reason: HOSPADM

## 2020-05-27 NOTE — PROGRESS NOTES
Problem: Depressed Mood (Adult/Pediatric)  Goal: *LTG: Understands illness and can identify signs of relapse  Outcome: Not Progressing Towards Goal

## 2020-05-27 NOTE — BH NOTES
Assumed care of patient at 0730. Patient was selectively mute in the morning. Patient came to the nurses station and stated \"I want to go home. \" Patient stated \"I dont want to be in this place anymore. \" Patient denied SI/HI and A/V hallucinations. Dr. Theodore Gunderson was contacted and he stated to have the patient evaluated by [facility]. The patient was then informed of the process and possible outcomes. [facility] was contacted and received the faxed documentation that they required for the evaluation. The [facility] emergency worker contacted the charge nurse and informed her that the patient did not meet criteria to be detained. Dr. Jess Mao was notified and the order was given to allow the patient to leave AMA. Patient belongings were reviewed and valuables were returned. Patient refused to sign AMA discharge paperwork. Patient stated \"I am not signing that. \" The form was signed by 2 registered nurses that patient refused. Patient was asked to arrange a ride home and she stated \"I will walk to where I am going. \" Patient left off the unit at 1639.

## 2020-05-27 NOTE — INTERDISCIPLINARY ROUNDS
[facility] Interdisciplinary Rounds Patient Name: Arnel Blair  Age: 34 y.o. Room/Bed:  315/02 Primary Diagnosis: <principal problem not specified> Admission Status: Voluntary Readmission within 30 days: no 
Power of  in place: no 
Patient requires a blocked bed: no           
Reason for blocked bed: NA but pt is in a private room due to covid Order for blocked bed obtained: no    
 
Sleep hours: 6 Morning Labs completed per orders:  yes but refused Participation in Care/Groups:  no 
Medication Compliant?: Refusing Psychiatric Medications PRNS (last 24 hours): None Restraints (last 24 hours):  no 
Substance Abuse:  no , neg UDS on admission 24 hour chart check complete: yes Patient goal(s) for today: Meet with treatment team; meet with [facility] re TDO assessment Treatment team focus/goals: Complete psychosocial 
Progress note: Patient presents as guarded and paranoid. Very limited in willingness to engage in interview with treatment team, demanding to have her belongings, and requesting to be discharged back home. [facility] called for TDO evaluation. LOS:  1  Expected LOS: TBD Financial concerns/prescription coverage: Amirah Ivy Family contact: None Family requesting physician contact today: No 
Discharge plan: Return home Access to weapons: No 
Outpatient provider(s): Refer to [facility] Patient's preferred phone number for follow up call: [phone] Participating treatment team members: Payal Etienne MSW; Dr. Mian Delgado MD

## 2020-05-27 NOTE — BH NOTES
Patient was admitted as a voluntary patient and requested discharge. MD asked for Pt to be evaluated by [facility] for TDO. [facility] found that Pt did not meet criteria for TDO. Pt to be discharged.

## 2020-05-27 NOTE — H&P
[facility] HISTORY AND PHYSICAL    Name:  Nasrin Murphy  MR#:  [id number]  :  1990  ACCOUNT #:  [de-identified]  ADMIT DATE:  2020    PSYCHIATRIC INTAKE NOTE    CHIEF COMPLAINT:  \"My mom brought me in. \"    HISTORY OF PRESENT ILLNESS:  This is a 26-year-old female with no psychiatric history or medical history per se, comes to the hospital by way of family where she reports a year of things being strange, the way her family is treating her, the way she is acting. She went to see a psychiatrist, outpatient, a year ago for this reason, never followed back up. Mother stated that she was not acting right and needs to get help, but denies any suicidal or homicidal ideations and no auditory or visual hallucinations. She is depressed and lonely at times, and believes her mother and daughter are out to get her, some paranoia it seems, and that is the strangeness that she has been experiencing for over a year. Seems to be preoccupied and has difficulty responding to the questions asked, slow to respond. Here voluntarily for management of her condition. PAST PSYCHIATRIC HISTORY:  Denies. PAST MEDICAL HISTORY:  Denies. ALLERGIES:  NONE KNOWN DRUG ALLERGIES. SOCIAL HISTORY:  Lives with family, single, no children, unemployed, but in here it says that she has a daughter, she denies it. Inconsistent responses. Denies drugs, alcohol, or cigarettes. MENTAL STATUS EXAM:  Adult female, calm, withdrawn, semi-cooperative, very sullen, but soft responses, almost inaudible, mumbled responses. Denies suicidal or homicidal ideations and no auditory or visual hallucinations. Seems preoccupied in her mind, exhibits some thought blocking at times. Paranoia evident with thoughts of others or family members trying to read her thoughts as she says, or they have the ability to read her thoughts. She recognizes it as a feeling seeming crazy, but that is what she feels.   Says it is like she belongs at home and wants to leave, and she does not want to stay here, but does not know where to go. ASSESSMENT:  This is a young adult female with bizarre thought process, mood symptoms for about a year now, progressive, no prior psychiatric history, here for management of her condition. DIAGNOSES:  Major depressive episodes with psychotic features versus adjustment disorder, mixed features. PLAN:  Admit for safety and stabilization, medication modification as needed, group therapy, individual therapy. ESTIMATED LENGTH OF STAY:  5-7 days. DISPOSITION:  Planning with Social Work. STRENGTHS:  Willingness for treatment. DISABILITIES:  Vague, inconsistent responses. SHARATH Butts MD      [id number]  D:  05/27/2020 12:25  T:  05/27/2020 16:15  JOB #:  [id number]

## 2020-05-27 NOTE — DISCHARGE INSTRUCTIONS
Patient Education        Learning About Depression Screening  What is depression screening? Depression screening is a way to see if you have depression symptoms. It may be done by a doctor or counselor. This screening is often part of a routine checkup. That's because your mental health is just as important as your physical health. Depression is a medical illness that affects how you feel, think, and act. You may:  · Have less energy. · Lose interest in your daily activities. · Feel sad and grouchy for a long time. Depression is very common. It affects men and women of all ages. Many things can trigger depression. Some people become depressed after they have a stroke or find out they have a major illness like cancer or heart disease. The death of a loved one, a breakup, or changes in the natural brain chemicals may lead to depression. It can run in families. Most experts believe that a combination of family history (a person's genes) and stressful life events can cause depression. What happens during screening? Your doctor may ask about your feelings, any changes in eating habits, your energy level, and your interest in your daily tasks. He or she may ask other questions, such as how well you are sleeping and if you can focus on the things you do. This may be an informal talk between the two of you. Or your doctor may ask you to fill out a quick form and then talk about your answers. Some diseases or changes in your body can cause symptoms that look like depression. So your doctor may do blood tests to help rule out other problems, such as hormone changes, a low thyroid level, or anemia. What happens after screening? If you have signs of depression, your doctor will talk to you about your options. Doctors usually treat depression with medicines or counseling. Often, combining the two works best. Many people don't get help because they think that they'll get over the depression on their own.  But people with depression may not get better unless they get treatment. Many people feel embarrassed or ashamed about having depression. But it isn't a sign of personal weakness. It's not a character flaw. A person who is depressed is not \"crazy. \" Depression is caused by changes in the brain. A serious symptom of depression is thinking about death or suicide. If you or someone you care about talks about this or about feeling hopeless, get help right away. It's important to know that depression can be treated. The first step toward feeling better is often just seeing that the problem exists. Where can you learn more? Go to http://jerPromiseUPcodey.info/  Enter T185 in the search box to learn more about \"Learning About Depression Screening. \"  Current as of: May 28, 2019Content Version: 12.4  © 0653-2221 [employer]. Care instructions adapted under license by [employer] (which disclaims liability or warranty for this information). If you have questions about a medical condition or this instruction, always ask your healthcare professional. [address] any warranty or liability for your use of this information. DISCHARGE SUMMARY    NAME:Elza De Dios  : 1990  MRN: [id number]    The patient Mari Ledesma exhibits the ability to control behavior in a less restrictive environment. Patient's level of functioning is improving. No assaultive/destructive behavior has been observed for the past 24 hours. No suicidal/homicidal threat or behavior has been observed for the past 24 hours. There is no evidence of serious medication side effects. Patient has not been in physical or protective restraints for at least the past 24 hours. If weapons involved, how are they secured? No weapons involved. Is patient aware of and in agreement with discharge plan? Yes    Arrangements for medication:  No prescriptions given.      Copy of discharge instructions to provider?:  [address] for transportation home:  Patient to arrange her own transportation. Keep all follow up appointments as scheduled, continue to take prescribed medications per physician instructions. Mental health crisis number:  [phone] or your local mental health crisis line number at [phone].

## 2020-05-27 NOTE — DISCHARGE SUMMARY
PSYCHIATRIC DISCHARGE SUMMARY         IDENTIFICATION:    Patient Name  Kd Arredondo   Date of Birth 1990   [facility] [id number]   Medical Record Number  [id number]      Age  34 y.o. PCP Krystle Patel NP   Admit date:  5/26/2020    Discharge date: 5/27/2020   Room Number  46/80  @ [facility]   Date of Service  5/27/2020            TYPE OF DISCHARGE: AMA                CONDITION AT DISCHARGE: improved       PROVISIONAL & DISCHARGE DIAGNOSES:    Problem List  Date Reviewed: 3/28/2019          Codes Class    * (Principal) Depression, major, recurrent, severe with psychosis ([facility] [address] ICD-10-CM: F33.3  ICD-9-CM: 296.34         Obesity, morbid ([address] ICD-10-CM: E66.01  ICD-9-CM: 278.01         Depression, major, single episode, moderate ([facility] [address] ICD-10-CM: F32.1  ICD-9-CM: 296.22               Active Hospital Problems    *Depression, major, recurrent, severe with psychosis ([facility] [address]        DISCHARGE DIAGNOSIS:   Axis I:  SEE ABOVE  Axis II: SEE ABOVE  Axis III: SEE ABOVE  Axis IV:  lack of structure  Axis V:  <50 on admission, 55+ on discharge     CC & HISTORY OF PRESENT ILLNESS:  26-year-old female with no psychiatric history or medical history per se, comes to the hospital by way of family where she reports a year of things being strange, the way her family is treating her, the way she is acting. She went to see a psychiatrist, outpatient, a year ago for this reason, never followed back up. Mother stated that she was not acting right and needs to get help, but denies any suicidal or homicidal ideations and no auditory or visual hallucinations. She is depressed and lonely at times, and believes her mother and daughter are out to get her, some paranoia it seems, and that is the strangeness that she has been experiencing for over a year. Seems to be preoccupied and has difficulty responding to the questions asked, slow to respond. Here voluntarily for management of her condition.      SOCIAL HISTORY: Social History     Socioeconomic History    Marital status: SINGLE     Spouse name: Not on file    Number of children: Not on file    Years of education: Not on file    Highest education level: Not on file   Occupational History    Not on file   Social Needs    Financial resource strain: Not on file    Food insecurity     Worry: Not on file     Inability: Not on file    Transportation needs     Medical: Not on file     Non-medical: Not on file   Tobacco Use    Smoking status: Never Smoker    Smokeless tobacco: Never Used   Substance and Sexual Activity    Alcohol use: Yes     Comment: on occ    Drug use: No    Sexual activity: Yes     Birth control/protection: None   Lifestyle    Physical activity     Days per week: Not on file     Minutes per session: Not on file    Stress: Not on file   Relationships    Social connections     Talks on phone: Not on file     Gets together: Not on file     Attends Church service: Not on file     Active member of club or organization: Not on file     Attends meetings of clubs or organizations: Not on file     Relationship status: Not on file    Intimate partner violence     Fear of current or ex partner: Not on file     Emotionally abused: Not on file     Physically abused: Not on file     Forced sexual activity: Not on file   Other Topics Concern    Not on file   Social History Narrative    Not on file      FAMILY HISTORY:   History reviewed. No pertinent family history. HOSPITALIZATION COURSE:    Farhan Johnson was admitted to the inpatient psychiatric unit [facility] (formerly [facility]3] for acute psychiatric stabilization in regards to symptomatology as described in the HPI above. The differential diagnosis at time of admission included: schizophrenia vs substance induced psychotic disorder schizoaffective vs MDD vs adjustment disorder. While on the unit Farhan Johnson was involved in individual, group, occupational and milieu therapy.   Psychiatric medications were adjusted during this hospitalization. Trupti Armijo demonstrated a progressive improvement in overall condition. Much of patient's initial presentation appeared to be related to situational stressors, effects of medication non-compliance and psychological factors. Please see individual progress notes for more specific details regarding patient's hospitalization course. Patient with request for discharge today. There are no grounds to seek a TDO. At time of discharge, Trupti Armijo is without significant problems of depression, psychosis, or roc. Patient free of suicidal and homicidal ideations (appears to be at very low risk of suicide or homicide) and reports many positive predictive factors in terms of not attempting suicide or homicide. Overall presentation at time of discharge is most consistent with the diagnosis of MDD rec severe with psychosis. Patient has maximized benefit to be derived from acute inpatient psychiatric treatment. All members of the treatment team concur with each other in regards to plans for discharge today. Following a discussion of the leaving the hospital against medical advice. Patient and family are aware and in agreement with discharge and discharge plan.          LABS AND IMAGAING:    Labs Reviewed   METABOLIC PANEL, COMPREHENSIVE - Abnormal; Notable for the following components:       Result Value    BUN/Creatinine ratio 10 (*)     AST (SGOT) 14 (*)     All other components within normal limits   ETHYL ALCOHOL   CBC WITH AUTOMATED DIFF   DRUG SCREEN, URINE   URINALYSIS W/ RFLX MICROSCOPIC   METABOLIC PANEL, COMPREHENSIVE   GLUCOSE, FASTING   HCG URINE, QL. - POC     No results found for: DS35, PHEN, PHENO, PHENT, DILF, DS39, PHENY, PTN, VALF2, VALAC, VALP, VALPR, DS6, CRBAM, CRBAMP, CARB2, XCRBAM  Admission on 05/26/2020   Component Date Value Ref Range Status    ALCOHOL(ETHYL),SERUM 05/26/2020 <10  <10 MG/DL Final    WBC 05/26/2020 7.0  3.6 - 11.0 K/uL Final    RBC 05/26/2020 4.71  3.80 - 5.20 M/uL Final    HGB 05/26/2020 12.4  11.5 - 16.0 g/dL Final    HCT 05/26/2020 39.4  35.0 - 47.0 % Final    MCV 05/26/2020 83.7  80.0 - 99.0 FL Final    MCH 05/26/2020 26.3  26.0 - 34.0 PG Final    MCHC 05/26/2020 31.5  30.0 - 36.5 g/dL Final    RDW 05/26/2020 13.8  11.5 - 14.5 % Final    PLATELET 15/19/0273 650  150 - 400 K/uL Final    MPV 05/26/2020 12.0  8.9 - 12.9 FL Final    NRBC 05/26/2020 0.0  0  WBC Final    ABSOLUTE NRBC 05/26/2020 0.00  0.00 - 0.01 K/uL Final    NEUTROPHILS 05/26/2020 48  32 - 75 % Final    LYMPHOCYTES 05/26/2020 42  12 - 49 % Final    MONOCYTES 05/26/2020 8  5 - 13 % Final    EOSINOPHILS 05/26/2020 2  0 - 7 % Final    BASOPHILS 05/26/2020 0  0 - 1 % Final    IMMATURE GRANULOCYTES 05/26/2020 0  0.0 - 0.5 % Final    ABS. NEUTROPHILS 05/26/2020 3.4  1.8 - 8.0 K/UL Final    ABS. LYMPHOCYTES 05/26/2020 2.9  0.8 - 3.5 K/UL Final    ABS. MONOCYTES 05/26/2020 0.5  0.0 - 1.0 K/UL Final    ABS. EOSINOPHILS 05/26/2020 0.1  0.0 - 0.4 K/UL Final    ABS. BASOPHILS 05/26/2020 0.0  0.0 - 0.1 K/UL Final    ABS. IMM.  GRANS. 05/26/2020 0.0  0.00 - 0.04 K/UL Final    DF 05/26/2020 AUTOMATED    Final    Sodium 05/26/2020 140  136 - 145 mmol/L Final    Potassium 05/26/2020 3.6  3.5 - 5.1 mmol/L Final    Chloride 05/26/2020 103  97 - 108 mmol/L Final    CO2 05/26/2020 30  21 - 32 mmol/L Final    Anion gap 05/26/2020 7  5 - 15 mmol/L Final    Glucose 05/26/2020 85  65 - 100 mg/dL Final    BUN 05/26/2020 10  6 - 20 MG/DL Final    Creatinine 05/26/2020 1.02  0.55 - 1.02 MG/DL Final    BUN/Creatinine ratio 05/26/2020 10* 12 - 20   Final    GFR est AA 05/26/2020 >60  >60 ml/min/1.73m2 Final    GFR est non-AA 05/26/2020 >60  >60 ml/min/1.73m2 Final    Calcium 05/26/2020 8.9  8.5 - 10.1 MG/DL Final    Bilirubin, total 05/26/2020 0.3  0.2 - 1.0 MG/DL Final    ALT (SGPT) 05/26/2020 17  12 - 78 U/L Final    AST (SGOT) 05/26/2020 14* 15 - 37 U/L Final    Alk. phosphatase 05/26/2020 53  45 - 117 U/L Final    Protein, total 05/26/2020 7.5  6.4 - 8.2 g/dL Final    Albumin 05/26/2020 3.9  3.5 - 5.0 g/dL Final    Globulin 05/26/2020 3.6  2.0 - 4.0 g/dL Final    A-G Ratio 05/26/2020 1.1  1.1 - 2.2   Final    AMPHETAMINES 05/26/2020 Negative  NEG   Final    BARBITURATES 05/26/2020 Negative  NEG   Final    BENZODIAZEPINES 05/26/2020 Negative  NEG   Final    COCAINE 05/26/2020 Negative  NEG   Final    METHADONE 05/26/2020 Negative  NEG   Final    OPIATES 05/26/2020 Negative  NEG   Final    PCP(PHENCYCLIDINE) 05/26/2020 Negative  NEG   Final    THC (TH-CANNABINOL) 05/26/2020 Negative  NEG   Final    Drug screen comment 05/26/2020 (NOTE)   Final    Color 05/26/2020 YELLOW/STRAW    Final    Appearance 05/26/2020 CLEAR  CLEAR   Final    Specific gravity 05/26/2020 <1.005  1.003 - 1.030 Final    pH (UA) 05/26/2020 6.5  5.0 - 8.0   Final    Protein 05/26/2020 Negative  NEG mg/dL Final    Glucose 05/26/2020 Negative  NEG mg/dL Final    Ketone 05/26/2020 Negative  NEG mg/dL Final    Bilirubin 05/26/2020 Negative  NEG   Final    Blood 05/26/2020 Negative  NEG   Final    Urobilinogen 05/26/2020 0.2  0.2 - 1.0 EU/dL Final    Nitrites 05/26/2020 Negative  NEG   Final    Leukocyte Esterase 05/26/2020 Negative  NEG   Final    Pregnancy test,urine (POC) 05/26/2020 Negative  NEG   Final     No results found. DISPOSITION:    Home. Patient to f/u with psychiatric, and psychotherapy appointments. Patient is to f/u with internist as directed. FOLLOW-UP CARE:    Activity as tolerated  Regular diet  Wound Care: none needed. Follow-up Information     Follow up With Specialties Details Why [address] [place] [address]  Call Please call [phone] between 8:00am and 2:00pm, Monday through Friday, to schedule an intake appointment for follow-up services.  [address] [place] [address] 4123 Tyler Maravilla NP Nurse Practitioner   Dia Ramos  [address] [place] [address]  [phone]                   PROGNOSIS:   Guarded --- based on nature of patient's pathology/ies and treatment compliance issues. Prognosis is greatly dependent upon patient's ability to remain sober and to follow up with scheduled appointments as well as to comply with psychiatric medications as prescribed. DISCHARGE MEDICATIONS:     Informed consent given for the use of following psychotropic medications: There are no discharge medications for this patient. A coordinated, multidisplinary treatment team round was conducted with Lizette Holley---this is done daily here at [facility]. This team consists of the nurse, psychiatric unit pharmacist,  and writer. I have spent greater than 35 minutes on discharge work.     Signed:  Ailene Soulier, MD  5/27/2020

## 2020-05-27 NOTE — BH NOTES
Assumed nursing care of [facility] after receiving the 1900 change of shift report. Suyapa Hartley presented with angry, labile affect and mood. She was isolative and stayed in her room/bed. When writer went into her room for the nursing assessment and to take her vital signs (which she refused) she denied all problems including SI. She was not willing to talk so writer left the room. Then at 2100 she came out into the hallway and stared yelling, \"Y'all keep fucking with me! Who's wants to fight me? \"  She kept repeating this several times while scanning the hallway. When staff attempted to reassure she just seemed to get angrier. However, within a few minutes she went back to bed. She was quiet until 80 when she came back out into the hallway and started asking for her journal and started pushing on doors. She had to be directed out of one peer's room. Again, she went back to bed after a short period. She refused medications. Maintaining close monitoring for mood chgs, behavior and for safety.

## 2020-05-27 NOTE — BH NOTES
Attempted to meet with Tom Hsu for her first individual session. She reports \"I just want to leave. Why can't I leave? \" Nurse was present and reports she has talked to Dr Saadia Charles and she will give her an update shortly. Patient is requesting to leave and declined to speak to this writer. Will follow up with patient tomorrow if she is still admitted.     Maureen Marcial [facility]

## 2020-05-27 NOTE — PROGRESS NOTES
Problem: Discharge Planning  Goal: *Discharge to safe environment  Outcome: Progressing Towards Goal  Note: Patient identifies home as a safe environment. Patient will return home upon discharge. Problem: Discharge Planning  Goal: *Knowledge of medication management  Outcome: Not Progressing Towards Goal  Note: Patient does not verbalize understanding of medication regimen. Patient is refusing to take medications as prescribed. Goal: *Knowledge of discharge instructions  Outcome: Not Progressing Towards Goal  Note: Patient does not verbalize understanding of goals for treatment and safe discharge.

## 2020-05-27 NOTE — PROGRESS NOTES
Problem: Altered Thought Process (Adult/Pediatric)  Goal: *STG: Participates in treatment plan  Outcome: Progressing Towards Goal  Goal: *STG: Remains safe in hospital  Outcome: Progressing Towards Goal  Goal: *LTG: Returns to baseline functioning  Outcome: Progressing Towards Goal

## 2022-03-19 PROBLEM — F33.3 DEPRESSION, MAJOR, RECURRENT, SEVERE WITH PSYCHOSIS (HCC): Status: ACTIVE | Noted: 2020-05-26

## 2022-03-19 PROBLEM — E66.01 OBESITY, MORBID (HCC): Status: ACTIVE | Noted: 2019-03-28

## 2022-03-19 PROBLEM — F32.1 DEPRESSION, MAJOR, SINGLE EPISODE, MODERATE (HCC): Status: ACTIVE | Noted: 2019-03-28

## 2022-05-23 ENCOUNTER — HOSPITAL ENCOUNTER (EMERGENCY)
Age: 32
Discharge: HOME | End: 2022-05-23
Attending: EMERGENCY MEDICINE
Payer: MEDICAID

## 2022-05-23 VITALS
WEIGHT: 225 LBS | OXYGEN SATURATION: 99 % | SYSTOLIC BLOOD PRESSURE: 150 MMHG | DIASTOLIC BLOOD PRESSURE: 73 MMHG | BODY MASS INDEX: 39.87 KG/M2 | TEMPERATURE: 97.9 F | HEART RATE: 88 BPM | HEIGHT: 63 IN | RESPIRATION RATE: 15 BRPM

## 2022-05-23 DIAGNOSIS — S39.012A STRAIN OF LUMBAR REGION, INITIAL ENCOUNTER: Primary | ICD-10-CM

## 2022-05-23 PROCEDURE — 99283 EMERGENCY DEPT VISIT LOW MDM: CPT

## 2022-05-23 RX ORDER — IBUPROFEN 800 MG/1
800 TABLET ORAL
Qty: 30 TABLET | Refills: 0 | Status: SHIPPED | OUTPATIENT
Start: 2022-05-23

## 2022-05-23 RX ORDER — METHOCARBAMOL 750 MG/1
750 TABLET, FILM COATED ORAL 4 TIMES DAILY
Qty: 20 TABLET | Refills: 0 | Status: SHIPPED | OUTPATIENT
Start: 2022-05-23

## 2022-05-23 NOTE — Clinical Note
[facility] EMERGENCY DEPT  [address] [address] [phone]    Work/School Note    Date: 5/23/2022    To Whom It May concern:    Agustin Amato was seen and treated today in the emergency room by the following provider(s):  Attending Provider: Hermes Mills MD.      Agustin Amato is excused from work/school on 05/23/22 and 05/24/22. She is medically clear to return to work/school on 5/25/2022.        Sincerely,          Joe Dias MD

## 2022-05-23 NOTE — ED NOTES
Emergency Department Nursing Plan of Care       The Nursing Plan of Care is developed from the Nursing assessment and Emergency Department Attending provider initial evaluation. The plan of care may be reviewed in the ED Provider note.     The Plan of Care was developed with the following considerations:   Patient / Family readiness to learn indicated by:verbalized understanding  Persons(s) to be included in education: patient  Barriers to Learning/Limitations:No    Signed     Davy Thomas RN    5/23/2022   2:19 PM

## 2022-05-23 NOTE — ED TRIAGE NOTES
Patient arrives to ED from home for c/o lower back pain after kettle ball swings on Friday. Pain worse with movement and standing.

## 2022-05-23 NOTE — ED PROVIDER NOTES
EMERGENCY DEPARTMENT HISTORY AND PHYSICAL EXAM      Date: 5/23/2022  Patient Name: Leann Luis    History of Presenting Illness     Chief Complaint   Patient presents with    Back Pain     History Provided By: Patient    HPI: Leann Luis, 32 y.o. female with past medical history significant for depression who presents via private vehicle to the ED with cc of bilateral low back pain the past 3 days. Patient was at a gym class and was doing kettle bell swings. She started to have some achiness after the class but states later that night the pain got worse. Her pain is described as a dull/aching sensation that does not radiate. Her pain is worse with certain positions including bending over or standing for prolonged periods of time. Her pain is better if she is laying flat. She has tried taking Tylenol for the pain with minimal improvement. She denies any bowel/bladder incontinence, radicular symptoms, or saddle anesthesia. PMHx: Depression  Social Hx: Occasional alcohol use, denies tobacco use, denies illegal drug use    PCP: Claudene Fu, NP    There are no other complaints, changes, or physical findings at this time. No current facility-administered medications on file prior to encounter. No current outpatient medications on file prior to encounter. Past History     Past Medical History:  Past Medical History:   Diagnosis Date    Depression      Past Surgical History:  No past surgical history on file. Family History:  History reviewed. No pertinent family history. Social History:  Social History     Tobacco Use    Smoking status: Never Smoker    Smokeless tobacco: Never Used   Substance Use Topics    Alcohol use: Yes     Comment: on occ    Drug use: No     Allergies:  No Known Allergies  Review of Systems   Review of Systems   Constitutional: Negative for chills and fever. HENT: Negative for congestion, rhinorrhea, sneezing and sore throat.     Eyes: Negative for redness and visual disturbance. Respiratory: Negative for shortness of breath. Cardiovascular: Negative for leg swelling. Gastrointestinal: Negative for abdominal pain, nausea and vomiting. Genitourinary: Negative for difficulty urinating and frequency. Musculoskeletal: Positive for back pain. Negative for myalgias and neck stiffness. Skin: Negative for rash. Neurological: Negative for dizziness, syncope, weakness and headaches. Hematological: Negative for adenopathy. All other systems reviewed and are negative. Physical Exam   Physical Exam  Vitals and nursing note reviewed. Constitutional:       Appearance: Normal appearance. She is well-developed. HENT:      Head: Normocephalic and atraumatic. Eyes:      Conjunctiva/sclera: Conjunctivae normal.   Cardiovascular:      Rate and Rhythm: Normal rate and regular rhythm. Pulses: Normal pulses. Heart sounds: Normal heart sounds, S1 normal and S2 normal.   Pulmonary:      Effort: Pulmonary effort is normal. No respiratory distress. Breath sounds: Normal breath sounds. No wheezing. Abdominal:      General: Bowel sounds are normal. There is no distension. Palpations: Abdomen is soft. Tenderness: There is no abdominal tenderness. There is no rebound. Musculoskeletal:         General: Normal range of motion. Cervical back: Normal, full passive range of motion without pain, normal range of motion and neck supple. Thoracic back: Normal.      Lumbar back: Spasms and tenderness present. No bony tenderness. Back:    Skin:     General: Skin is warm and dry. Findings: No rash. Neurological:      Mental Status: She is alert and oriented to person, place, and time. Psychiatric:         Speech: Speech normal.         Behavior: Behavior normal.         Thought Content:  Thought content normal.         Judgment: Judgment normal.       Diagnostic Study Results   Labs -   No results found for this or any previous visit (from the past 12 hour(s)). Radiologic Studies -   No orders to display     No results found. Medical Decision Making   I am the first provider for this patient. I reviewed the vital signs, available nursing notes, past medical history, past surgical history, family history and social history. Vital Signs-Reviewed the patient's vital signs. Patient Vitals for the past 24 hrs:   Temp Pulse Resp BP SpO2   05/23/22 1347 97.9 °F (36.6 °C) 88 15 (!) 150/73 99 %     Pulse Oximetry Analysis - 99% on RA (normal)    Records Reviewed: Nursing Notes    Provider Notes (Medical Decision Making):   26-year-old female presents with bilateral low back pain for the past 3 days after injuring it in a gym class. She denies any concerning symptoms for cauda equina. No indications for imaging at this time. Suspect this is a muscular strain versus a herniated disc. Will treat with NSAIDs and a muscle relaxer and have her follow-up with outpatient primary care as needed. Write her a note keeping her out of work for the next 2 days to allow time for her back to heal.    ED Course:   Initial assessment performed. The patients presenting problems have been discussed, and they are in agreement with the care plan formulated and outlined with them. I have encouraged them to ask questions as they arise throughout their visit. Progress Note:   Updated pt on all returned results and findings. Discussed the importance of proper follow up as referred below along with return precautions. Pt in agreement with the care plan and expresses agreement with and understanding of all items discussed. Disposition:  Discharge Note:  The pt is ready for discharge. The pt's signs, symptoms, diagnosis, and discharge instructions have been discussed and pt has conveyed their understanding. The pt is to follow up as recommended or return to ER should their symptoms worsen. Plan has been discussed and pt is in agreement. PLAN:  1.    Current Discharge Medication List      START taking these medications    Details   ibuprofen (MOTRIN) 800 mg tablet Take 1 Tablet by mouth every eight (8) hours as needed for Pain. Qty: 30 Tablet, Refills: 0  Start date: 5/23/2022      methocarbamoL (ROBAXIN) 750 mg tablet Take 1 Tablet by mouth four (4) times daily. Qty: 20 Tablet, Refills: 0  Start date: 5/23/2022           2. Follow-up Information     Follow up With Specialties Details Why Contact Andrea Cooper NP Nurse Practitioner Schedule an appointment as soon as possible for a visit   [place]  [facility] [address] [address]  [phone]      [facility] EMERGENCY DEPT Emergency Medicine  As needed, If symptoms worsen [address] [facility]  [phone]        Return to ED if worse     Diagnosis     Clinical Impression:   1. Strain of lumbar region, initial encounter            Please note that this dictation was completed with Dragon, computer voice recognition software. Quite often unanticipated grammatical, syntax, homophones, and other interpretive errors are inadvertently transcribed by the computer software. Please disregard these errors. Additionally, please excuse any errors that have escaped final proofreading.

## 2023-02-14 ENCOUNTER — HOSPITAL ENCOUNTER (EMERGENCY)
Age: 33
Discharge: HOME | End: 2023-02-14
Attending: EMERGENCY MEDICINE
Payer: COMMERCIAL

## 2023-02-14 VITALS
OXYGEN SATURATION: 100 % | TEMPERATURE: 99.3 F | SYSTOLIC BLOOD PRESSURE: 134 MMHG | BODY MASS INDEX: 41.55 KG/M2 | RESPIRATION RATE: 14 BRPM | DIASTOLIC BLOOD PRESSURE: 75 MMHG | HEART RATE: 82 BPM | WEIGHT: 234.5 LBS | HEIGHT: 63 IN

## 2023-02-14 DIAGNOSIS — F32.A DEPRESSION, UNSPECIFIED DEPRESSION TYPE: Primary | ICD-10-CM

## 2023-02-14 PROCEDURE — 90791 PSYCH DIAGNOSTIC EVALUATION: CPT

## 2023-02-14 PROCEDURE — 99282 EMERGENCY DEPT VISIT SF MDM: CPT

## 2023-02-14 RX ORDER — ARIPIPRAZOLE 400 MG
400 KIT INTRAMUSCULAR
COMMUNITY

## 2023-02-14 RX ORDER — SERTRALINE HYDROCHLORIDE 50 MG/1
TABLET, FILM COATED ORAL DAILY
COMMUNITY

## 2023-02-14 NOTE — ED NOTES
Discharge instructions were given to the patient by Ronalee Brittle, RN. The patient left the Emergency Department ambulatory, alert and oriented and in no acute distress with 0 prescriptions. The patient was encouraged to call or return to the ED for worsening issues or problems and was encouraged to schedule a follow up appointment for continuing care. The patient verbalized understanding of discharge instructions and prescriptions, all questions were answered. The patient has no further concerns at this time.

## 2023-02-14 NOTE — Clinical Note
[facility] EMERGENCY DEPT  [address] [facility] [address] [facility] [address] [phone]    Work/School Note    Date: 2/14/2023    To Whom It May concern:      Jamarcus Juarez was seen and treated today in the emergency room by the following provider(s):  Attending Provider: Lola Kapadia MD.      Jamarcus Juarez is excused from work/school on 02/14/23. She is clear to return to work/school on 02/15/23.         Sincerely,          Paresh Moon MD

## 2023-02-14 NOTE — ED PROVIDER NOTES
EMERGENCY DEPARTMENT HISTORY AND PHYSICAL EXAM      Patient Name: Taniya Llanos  MRN: [id number]  YOB: 1990    Provider: Jarret Jack MD  PCP: Zuly Gerardo NP     Time/Date of evaluation: 10:10 AM 2/14/23    History of Presenting Illness     Chief Complaint   Patient presents with    Mental Health Problem     Patient reports depression x 2 weeks. She denies any thoughts of harming herself or others but states she feels period       History Provided By: Patient     History Crowley Corporal):   Taniya Llanos is a 28 y.o. female with a PMHx of depression  who presents to the emergency department (room 6) by POV C/O depression, feeling overwhelmed and stressed, and passive suicidal ideations a few weeks ago that have now resolved. Patient states she is stressed at work and believes she does not need to be admitted but would like some resources to help with managing stress and coping skills. She reports compliance with her medications and tells me she is on Abilify injections and sertraline. She was followed by [facility] but has missed a few appointments and needs to reestablish care. Past History     Past Medical History:  Past Medical History:   Diagnosis Date    Depression        Past Surgical History:  No past surgical history on file. Family History:  History reviewed. No pertinent family history. Social History:  Social History     Tobacco Use    Smoking status: Never    Smokeless tobacco: Never   Substance Use Topics    Alcohol use: Yes     Comment: on occ    Drug use: No       Medications:  Current Outpatient Medications   Medication Sig Dispense Refill    ARIPiprazole (ABILIFY MAINTENA) 400 mg injection 400 mg by IntraMUSCular route every twenty-eight (28) days. sertraline (Zoloft) 50 mg tablet Take  by mouth daily. ibuprofen (MOTRIN) 800 mg tablet Take 1 Tablet by mouth every eight (8) hours as needed for Pain.  (Patient not taking: Reported on 2/14/2023) 30 Tablet 0 methocarbamoL (ROBAXIN) 750 mg tablet Take 1 Tablet by mouth four (4) times daily. (Patient not taking: Reported on 2/14/2023) 20 Tablet 0       Allergies:  No Known Allergies    Social Determinants of Health:  Social Determinants of Health     Tobacco Use: Low Risk     Smoking Tobacco Use: Never    Smokeless Tobacco Use: Never    Passive Exposure: Not on file   Alcohol Use: Not on file   Financial Resource Strain: Not on file   Food Insecurity: Not on file   Transportation Needs: Not on file   Physical Activity: Not on file   Stress: Not on file   Social Connections: Not on file   Intimate Partner Violence: Not on file   Depression: Not on file   Housing Stability: Not on file       Review of Systems     Review of Systems   Psychiatric/Behavioral:  Positive for dysphoric mood and suicidal ideas. Negative for agitation, hallucinations and self-injury. The patient is not nervous/anxious. Physical Exam     Patient Vitals for the past 24 hrs:   Temp Pulse Resp BP SpO2   02/14/23 0944 99.3 °F (37.4 °C) 82 14 134/75 100 %       Physical Exam  Vitals and nursing note reviewed. Constitutional:       Appearance: Normal appearance. She is well-developed. HENT:      Head: Normocephalic and atraumatic. Eyes:      Conjunctiva/sclera: Conjunctivae normal.   Cardiovascular:      Rate and Rhythm: Normal rate and regular rhythm. Pulses: Normal pulses. Heart sounds: Normal heart sounds, S1 normal and S2 normal.   Pulmonary:      Effort: Pulmonary effort is normal. No respiratory distress. Breath sounds: Normal breath sounds. No wheezing. Abdominal:      General: Bowel sounds are normal. There is no distension. Palpations: Abdomen is soft. Tenderness: There is no abdominal tenderness. There is no rebound. Musculoskeletal:         General: Normal range of motion. Cervical back: Full passive range of motion without pain, normal range of motion and neck supple.    Skin:     General: Skin is warm and dry. Findings: No rash. Neurological:      Mental Status: She is alert and oriented to person, place, and time. Psychiatric:         Mood and Affect: Mood is depressed. Affect is flat and tearful. Speech: Speech normal.         Behavior: Behavior normal.         Thought Content: Thought content does not include homicidal ideation. Thought content does not include homicidal or suicidal plan. Judgment: Judgment normal.       ED Course     10:10 AM I Rl Bowman MD) am the first provider for this patient. Initial assessment performed. I reviewed the vital signs, available nursing notes, past medical history, past surgical history, family history and social history. The patients presenting problems have been discussed, and they are in agreement with the care plan formulated and outlined with them. I have encouraged them to ask questions as they arise throughout their visit. Records Reviewed: Nursing Notes and Old Medical Records    MEDICATIONS ADMINISTERED IN THE ED:  Medications - No data to display    ED Course as of 02/14/23 1104   Tue Feb 14, 2023   1017 10:17 AM  Rl Bowman MD spoke with Opal Seaman for [facility]. Discussed HPI and PE, available diagnostic tests and clinical findings. He is in agreement with care plans as outlined. He will see and evaluate the patient. [MS]   Tee [id number] is seen and evaluated the patient and agrees that she is safe for discharge. He will provide her outpatient resources and have her follow-up with [facility]. [MS]      ED Course User Index  [MS] Lola Kapadia MD       Medical Decision Making     DDX: Depression, anxiety, financial stressors    DISCUSSION:  This appears to be a moderate acute worsening of a chronic condition. 28 y.o. female presents with increased stress, depression, and feeling overwhelmed.   2 weeks ago she had fleeting suicidal thoughts but tells me she is in a better place and is no longer feeling suicidal. She would like assistance with stress management, resources to help with coping, and potentially outpatient therapy resources. The patient agrees with the plan of discharge. Additional Considerations:  None    Diagnosis and Disposition     DISCHARGE NOTE:  Eliza Holley's results have been reviewed with her. She has been counseled regarding her diagnosis, treatment, and plan. She verbally conveys understanding and agreement of the signs, symptoms, diagnosis, treatment and prognosis and additionally agrees to follow up as discussed. She also agrees with the care-plan and conveys that all of her questions have been answered. I have also provided discharge instructions for her that include: educational information regarding their diagnosis and treatment, and list of reasons why they would want to return to the ED prior to their follow-up appointment, should her condition change. She has been provided with education for proper emergency department utilization. CLINICAL IMPRESSION:    1. Depression, unspecified depression type        PLAN:  1. D/C Home  2. Current Discharge Medication List        3. Follow-up Information       Follow up With Special[facility] Stefanie Juárez  Call  to arrange primary care [address]  Schedule an appointment as soon as possible for a visit   [address] [place] [address] [phone]          I Argenis Barber MD am the primary clinician of record. Lupe Disclaimer     Please note that this dictation was completed with Talknote, the computer voice recognition software. Quite often unanticipated grammatical, syntax, homophones, and other interpretive errors are inadvertently transcribed by the computer software. Please disregard these errors. Please excuse any errors that have escaped final proofreading.     Argenis Barber MD

## 2023-02-14 NOTE — ED NOTES
Pt presents to ED ambulatory complaining of depression x weeks. Pt states she has had increased paranoia. Pt states is having AVH. Pt states \"it sounds like people are talking about me\" and reports \"I see a dark figure that I know isnt there\". Pt states a hx of schizoaffective and reports taking IM Abilify monthly and daily Zoloft. Pt states she has been complaint w/ her medication. Pt denies SI/HI. Pt is alert and oriented x 4, RR even and unlabored, skin is warm and dry. Assessment completed and pt updated on plan of care. Call bell in reach. Emergency Department Nursing Plan of Care       The Nursing Plan of Care is developed from the Nursing assessment and Emergency Department Attending provider initial evaluation. The plan of care may be reviewed in the ED Provider note.     The Plan of Care was developed with the following considerations:   Patient / Family readiness to learn indicated by:verbalized understanding  Persons(s) to be included in education: patient  Barriers to Learning/Limitations:No    Signed     Karina Jimenes RN    2/14/2023   10:14 AM

## 2023-02-14 NOTE — BSMART NOTE
Comprehensive Assessment Form Part 1      Section I - Disposition    Axis I - Adjustment d/o with mixed anxiety    Schizophrenia, paranoid type VS Schizoaffective d/o by hx               Major Depression, single episode by hx    Axis II - deferred  Axis III - Paresthesia, Amenorrhea by hx         The Medical Doctor to Psychiatrist conference was not completed. Medical doctor is in agreement with this counselor's assessment and plan of care. The plan is to discharge and follow up with [facility]. The provider consulted was Dr. Isabel Vazquez. The admitting Psychiatrist will be Dr. Davin Kam. The admitting Diagnosis is n/a. Th [place] Suicide Scale - This writer reviewed the [place] Suicide Severity Rating Scale in nursing flow sheet and the risk level assigned is low. Based on this assessment, the risk of suicide is low and the plan is to discharge and follow up with [facility].e Payor source is [employer] MEDICA[employer] - [address] [place] [address]. Section II - Integrated Summary  Summary:  Per triage/provider:  Patient is a 29 yo female who arrives at ED via EMS with chief complaint of depression x 2 weeks. She denies any thoughts of harming herself or others but states she feels period. Patient presents tearful and sad. She says,\"I feel so embarrassed that I'm crying over a boyfriend. \" Patient reports having a relationship with a male that was primarily \"sexual\" in nature that she recently decided to end about 2 weeks ago \"because it was going nowhere. \" Patient Denies SI but says she was having suicidal thoughts about the same time she ended the relationship. She says she is currently more anxious than depressed and has no intention of harming herself. Patient reports being followed by [facility] in the past but lapsed in keeping her appointments so case was closed in August 2021. Patient says she would like to resume services with [facility] but was told they have \"no room until the end of the month. \" This writer called [facility] and spoke to Avi Lopez who reports patient is welcome to come in today anytime before 2pm and register for services. This was communicated to patient who seemed very relieved and grateful. Patient reports other stressors as financial and job related. She is currently employed at [employer] and [employer]. She states that sometimes having two jobs \"is a little too much. \" She lives with her mother in [facility] along with other family members. Patient also complains of occasional paranoia and believes \"sometimes I think people are talking about me or saying bad things about me. \" Patient denies A/V/H and does not appear to be debilitated or incapacitated by paranoid thoughts saying, \"\"it's just bothersome. \"    Patient is well groomed, well nourished, and demonstrates adequate hygiene. Patient is alert and cooperative. Speech is clear, spontaneous, and understandable with normal rate, rhythm, and volume. Mood is sad and tearful with congruent affect. Thought process is linear, organized, and coherent. There is no evidence of roc or psychosis. She does not appear to be responding to internal stimuli. She is oriented X4. Patient denies any recent alcohol or illicit drug use. Patient's labs were not ordered. Patient denies homicidal ideation. Patient denies any history of physical aggression or violence. Patient denies any problems with sleep. Patient has a previous psych admission to [facility] on 3/27/20. She reports no previous suicide attempts. She is prescribed Zoloft 50mg and Abilify 400mg IM with last injection on 1/22/23. She remains compliant with all Rx meds. Patient is not requesting a psychiatric admission but desires to resume services with [facility]. The plan is to discharge and follow up with [facility]. The patient has demonstrated mental capacity to provide informed consent. The information is given by the patient and past medical records. The Chief Complaint is depression, SI with no plan.   The Precipitant Factors are hx of depression and recent breakup with boyfriend. Previous Hospitalizations: [facility]. The patient has not previously been in restraints. Current Psychiatrist and/or  is n/a. Lethality Assessment:    The potential for suicide noted by the following: not noted. The potential for homicide is not noted. The patient has not been a perpetrator of sexual or physical abuse. There are not pending charges. The patient is not felt to be at risk for self harm or harm to others. The attending nurse was advised no further monitoring is necessary at this time. Section III - Psychosocial  The patient's overall mood and attitude is sad and tearful. Feelings of helplessness and hopelessness are not observed. Generalized anxiety is not observed. Panic is not observed. Phobias are not observed. Obsessive compulsive tendencies are not observed. Section IV - Mental Status Exam  The patient's appearance shows no evidence of impairment. The patient's behavior shows no evidence of impairment. The patient is oriented to time, place, person and situation. The patient's speech is soft. The patient's mood is sad. The range of affect is constricted. The patient's thought content demonstrates delusions and paranoia (mild). The thought process shows no evidence of impairment. The patient's perception shows no evidence of impairment. The patient's memory shows no evidence of impairment. The patient's appetite shows no evidence of impairment. The patient's sleep shows no evidence of impairment. The patient's insight shows no evidence of impairment. The patient's judgement shows no evidence of impairment. Section V - Substance Abuse  The patient is not using substances. Section VI - Living Arrangements  The patient is single. The patient lives with a parent. The patient has no children. The patient does plan to return home upon discharge. The patient does not have legal issues pending. The patient's source of income comes from employment. Pentecostalism and cultural practices have not been voiced at this time. The patient's greatest support comes from mother and this person will not be involved with the treatment. The patient has not been in an event described as horrible or outside the realm of ordinary life experience either currently or in the past.  The patient has not been a victim of sexual/physical abuse. Section VII - Other Areas of Clinical Concern  The highest grade achieved is some college with the overall quality of school experience being described as ok. The patient is currently employed and speaks [place] as a primary language. The patient has no communication impairments affecting communication. The patient's preference for learning can be described as: can read and write adequately.   The patient's hearing is normal.  The patient's vision is normal.      Aleksandar Mathur, SERENE

## 2023-02-14 NOTE — PROGRESS NOTES
BSMART assessment completed, and suicide risk level noted to be low. ED Provider/Dr Virginie Day and Nurse/Coby notified. Concerns not observed. Security/Off- has not been notified.

## 2023-06-22 ENCOUNTER — HOSPITAL ENCOUNTER (OUTPATIENT)
Facility: HOSPITAL | Age: 33
Setting detail: RECURRING SERIES
Discharge: HOME | End: 2023-06-25
Payer: COMMERCIAL

## 2023-06-22 PROCEDURE — 97530 THERAPEUTIC ACTIVITIES: CPT | Performed by: PHYSICAL THERAPIST

## 2023-06-22 PROCEDURE — 97161 PT EVAL LOW COMPLEX 20 MIN: CPT | Performed by: PHYSICAL THERAPIST

## 2023-06-22 PROCEDURE — 97110 THERAPEUTIC EXERCISES: CPT | Performed by: PHYSICAL THERAPIST

## 2023-06-30 ENCOUNTER — HOSPITAL ENCOUNTER (OUTPATIENT)
Facility: HOSPITAL | Age: 33
Setting detail: RECURRING SERIES
End: 2023-06-30
Payer: COMMERCIAL

## 2023-06-30 PROCEDURE — 97110 THERAPEUTIC EXERCISES: CPT

## 2023-09-08 ENCOUNTER — APPOINTMENT (OUTPATIENT)
Facility: HOSPITAL | Age: 33
End: 2023-09-08
Payer: COMMERCIAL

## 2023-09-08 ENCOUNTER — HOSPITAL ENCOUNTER (EMERGENCY)
Facility: HOSPITAL | Age: 33
Discharge: HOME | End: 2023-09-08
Payer: COMMERCIAL

## 2023-09-08 VITALS
BODY MASS INDEX: 44.47 KG/M2 | TEMPERATURE: 98.3 F | DIASTOLIC BLOOD PRESSURE: 83 MMHG | OXYGEN SATURATION: 98 % | SYSTOLIC BLOOD PRESSURE: 137 MMHG | WEIGHT: 251 LBS | HEIGHT: 63 IN | HEART RATE: 80 BPM | RESPIRATION RATE: 18 BRPM

## 2023-09-08 DIAGNOSIS — R53.83 FATIGUE, UNSPECIFIED TYPE: Primary | ICD-10-CM

## 2023-09-08 DIAGNOSIS — R07.9 INTERMITTENT CHEST PAIN: ICD-10-CM

## 2023-09-08 LAB
ALBUMIN SERPL-MCNC: 3.5 G/DL (ref 3.5–5)
ALBUMIN/GLOB SERPL: 0.9 (ref 1.1–2.2)
ALP SERPL-CCNC: 59 U/L (ref 45–117)
ALT SERPL-CCNC: 24 U/L (ref 12–78)
ANION GAP SERPL CALC-SCNC: 5 MMOL/L (ref 5–15)
APPEARANCE UR: ABNORMAL
AST SERPL-CCNC: 28 U/L (ref 15–37)
BACTERIA URNS QL MICRO: NEGATIVE /HPF
BASOPHILS # BLD: 0 K/UL (ref 0–0.1)
BASOPHILS NFR BLD: 1 % (ref 0–1)
BILIRUB SERPL-MCNC: 0.2 MG/DL (ref 0.2–1)
BILIRUB UR QL: NEGATIVE
BUN SERPL-MCNC: 17 MG/DL (ref 6–20)
BUN/CREAT SERPL: 16 (ref 12–20)
CALCIUM SERPL-MCNC: 8.8 MG/DL (ref 8.5–10.1)
CHLORIDE SERPL-SCNC: 103 MMOL/L (ref 97–108)
CO2 SERPL-SCNC: 32 MMOL/L (ref 21–32)
COLOR UR: ABNORMAL
CREAT SERPL-MCNC: 1.09 MG/DL (ref 0.55–1.02)
DIFFERENTIAL METHOD BLD: ABNORMAL
EKG ATRIAL RATE: 66 BPM
EKG DIAGNOSIS: NORMAL
EKG P AXIS: 46 DEGREES
EKG P-R INTERVAL: 154 MS
EKG Q-T INTERVAL: 396 MS
EKG QRS DURATION: 72 MS
EKG QTC CALCULATION (BAZETT): 415 MS
EKG R AXIS: 65 DEGREES
EKG T AXIS: 9 DEGREES
EKG VENTRICULAR RATE: 66 BPM
EOSINOPHIL # BLD: 0.1 K/UL (ref 0–0.4)
EOSINOPHIL NFR BLD: 2 % (ref 0–7)
EPITH CASTS URNS QL MICRO: ABNORMAL /LPF
ERYTHROCYTE [DISTWIDTH] IN BLOOD BY AUTOMATED COUNT: 15.2 % (ref 11.5–14.5)
FLUAV RNA SPEC QL NAA+PROBE: NOT DETECTED
FLUBV RNA SPEC QL NAA+PROBE: NOT DETECTED
GLOBULIN SER CALC-MCNC: 4 G/DL (ref 2–4)
GLUCOSE SERPL-MCNC: 96 MG/DL (ref 65–100)
GLUCOSE UR STRIP.AUTO-MCNC: NEGATIVE MG/DL
HCG UR QL: NEGATIVE
HCT VFR BLD AUTO: 38.5 % (ref 35–47)
HGB BLD-MCNC: 12 G/DL (ref 11.5–16)
HGB UR QL STRIP: NEGATIVE
IMM GRANULOCYTES # BLD AUTO: 0 K/UL (ref 0–0.04)
IMM GRANULOCYTES NFR BLD AUTO: 0 % (ref 0–0.5)
KETONES UR QL STRIP.AUTO: NEGATIVE MG/DL
LEUKOCYTE ESTERASE UR QL STRIP.AUTO: NEGATIVE
LYMPHOCYTES # BLD: 1.8 K/UL (ref 0.8–3.5)
LYMPHOCYTES NFR BLD: 27 % (ref 12–49)
MCH RBC QN AUTO: 25.3 PG (ref 26–34)
MCHC RBC AUTO-ENTMCNC: 31.2 G/DL (ref 30–36.5)
MCV RBC AUTO: 81.1 FL (ref 80–99)
MONOCYTES # BLD: 0.6 K/UL (ref 0–1)
MONOCYTES NFR BLD: 9 % (ref 5–13)
NEUTS SEG # BLD: 3.9 K/UL (ref 1.8–8)
NEUTS SEG NFR BLD: 61 % (ref 32–75)
NITRITE UR QL STRIP.AUTO: NEGATIVE
NRBC # BLD: 0 K/UL (ref 0–0.01)
NRBC BLD-RTO: 0 PER 100 WBC
PH UR STRIP: 6 (ref 5–8)
PLATELET # BLD AUTO: 232 K/UL (ref 150–400)
PMV BLD AUTO: 11.7 FL (ref 8.9–12.9)
POTASSIUM SERPL-SCNC: 4 MMOL/L (ref 3.5–5.1)
PROT SERPL-MCNC: 7.5 G/DL (ref 6.4–8.2)
PROT UR STRIP-MCNC: NEGATIVE MG/DL
RBC # BLD AUTO: 4.75 M/UL (ref 3.8–5.2)
RBC #/AREA URNS HPF: ABNORMAL /HPF (ref 0–5)
SARS-COV-2 RNA RESP QL NAA+PROBE: NOT DETECTED
SODIUM SERPL-SCNC: 140 MMOL/L (ref 136–145)
SP GR UR REFRACTOMETRY: 1.02
TROPONIN I SERPL HS-MCNC: 7 NG/L (ref 0–51)
TROPONIN I SERPL HS-MCNC: 7 NG/L (ref 0–51)
URINE CULTURE IF INDICATED: ABNORMAL
UROBILINOGEN UR QL STRIP.AUTO: 1 EU/DL (ref 0.2–1)
WBC # BLD AUTO: 6.4 K/UL (ref 3.6–11)
WBC URNS QL MICRO: ABNORMAL /HPF (ref 0–4)

## 2023-09-08 PROCEDURE — 80053 COMPREHEN METABOLIC PANEL: CPT

## 2023-09-08 PROCEDURE — 87636 SARSCOV2 & INF A&B AMP PRB: CPT

## 2023-09-08 PROCEDURE — 84484 ASSAY OF TROPONIN QUANT: CPT

## 2023-09-08 PROCEDURE — 85025 COMPLETE CBC W/AUTO DIFF WBC: CPT

## 2023-09-08 PROCEDURE — 99285 EMERGENCY DEPT VISIT HI MDM: CPT

## 2023-09-08 PROCEDURE — 36415 COLL VENOUS BLD VENIPUNCTURE: CPT

## 2023-09-08 PROCEDURE — 71045 X-RAY EXAM CHEST 1 VIEW: CPT

## 2023-09-08 PROCEDURE — 93005 ELECTROCARDIOGRAM TRACING: CPT | Performed by: EMERGENCY MEDICINE

## 2023-09-08 PROCEDURE — 81001 URINALYSIS AUTO W/SCOPE: CPT

## 2023-09-08 PROCEDURE — 81025 URINE PREGNANCY TEST: CPT

## 2023-09-08 ASSESSMENT — PAIN - FUNCTIONAL ASSESSMENT: PAIN_FUNCTIONAL_ASSESSMENT: NONE - DENIES PAIN
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
## 2023-09-08 NOTE — ED NOTES
Patient given copy of dc instructions and 0 script(s). Patient verbalized understanding of instructions and script (s). Patient given a current medication reconciliation form and verbalized understanding of their medications. Patient verbalized understanding of the importance of discussing medications with his or her physician or clinic they will be following up with. Patient alert and oriented and in no acute distress. Patient discharged home ambulatory with self.        Maggi Anthony RN  09/08/23 [id number]
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
## 2023-09-11 LAB
EKG ATRIAL RATE: 66 BPM
EKG DIAGNOSIS: NORMAL
EKG P AXIS: 46 DEGREES
EKG P-R INTERVAL: 154 MS
EKG Q-T INTERVAL: 396 MS
EKG QRS DURATION: 72 MS
EKG QTC CALCULATION (BAZETT): 415 MS
EKG R AXIS: 65 DEGREES
EKG T AXIS: 9 DEGREES
EKG VENTRICULAR RATE: 66 BPM

## 2023-09-11 PROCEDURE — 93010 ELECTROCARDIOGRAM REPORT: CPT | Performed by: SPECIALIST

## 2023-09-13 ENCOUNTER — NURSE TRIAGE (OUTPATIENT)
Dept: OTHER | Facility: CLINIC | Age: 33
End: 2023-09-13
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
## 2023-09-18 ENCOUNTER — TELEMEDICINE (OUTPATIENT)
Facility: CLINIC | Age: 33
End: 2023-09-18
Payer: COMMERCIAL

## 2023-09-18 DIAGNOSIS — R53.82 CHRONIC FATIGUE: Primary | ICD-10-CM

## 2023-09-18 DIAGNOSIS — M54.50 ACUTE LOW BACK PAIN WITHOUT SCIATICA, UNSPECIFIED BACK PAIN LATERALITY: ICD-10-CM

## 2023-09-18 DIAGNOSIS — M25.562 CHRONIC PAIN OF BOTH KNEES: ICD-10-CM

## 2023-09-18 DIAGNOSIS — Z76.89 ENCOUNTER TO ESTABLISH CARE: ICD-10-CM

## 2023-09-18 DIAGNOSIS — G89.29 CHRONIC PAIN OF BOTH KNEES: ICD-10-CM

## 2023-09-18 DIAGNOSIS — Z02.9 ADMINISTRATIVE ENCOUNTER: ICD-10-CM

## 2023-09-18 DIAGNOSIS — M25.561 CHRONIC PAIN OF BOTH KNEES: ICD-10-CM

## 2023-09-18 DIAGNOSIS — K52.9 CHRONIC DIARRHEA: ICD-10-CM

## 2023-09-18 PROCEDURE — 99204 OFFICE O/P NEW MOD 45 MIN: CPT | Performed by: NURSE PRACTITIONER

## 2023-09-18 RX ORDER — ARIPIPRAZOLE 400 MG
KIT INTRAMUSCULAR
COMMUNITY
Start: 2023-08-22 | End: 2023-09-18 | Stop reason: SDUPTHER

## 2023-09-18 RX ORDER — SERTRALINE HYDROCHLORIDE 100 MG/1
TABLET, FILM COATED ORAL
COMMUNITY
Start: 2023-08-14

## 2023-09-18 RX ORDER — TRAZODONE HYDROCHLORIDE 50 MG/1
TABLET ORAL
COMMUNITY

## 2023-09-18 NOTE — PROGRESS NOTES
Pt is here for   Chief Complaint   Patient presents with    New Patient     Re Establishing Care. Last seen March of 2019     1. Have you been to the ER, urgent care clinic since your last visit? Hospitalized since your last visit? Yes When: 09/08/2023 [facility] for fatigue and chest pain     2. Have you seen or consulted any other health care providers outside of the [address] [place] [address] Avenue since your last visit? Include any pap smears or colon screening.  No

## 2023-09-18 NOTE — PROGRESS NOTES
Follow-up and Dispositions    Return for OV 4 wk- BMI, LBP, fatigue. Carmen Grant is a 28 y.o. female New patient, here for evaluation of the following chief complaint(s):   New Patient (Re Establishing Care. Last seen March of 2019)          Assessment & Plan:   Below is the assessment and plan developed based on review of pertinent history, physical exam, labs, studies, and medications. 1. Chronic fatigue  Not at goal, monitor for cause. - Vitamin B12; Future  - Vitamin D 25 Hydroxy; Future  - TSH; Future  - Hemoglobin A1C; Future  - Amb External Referral To Gastroenterology    2. Acute low back pain without sciatica, unspecified back pain laterality  Not improved, referred back to PT per request. Will complete paperwork for RTW WITH restrictions.   - [facility] - Physical Therapy at [facility]    3. Chronic diarrhea  Not resolved, given referral to GI for sooner appt. Need to r/o crohn's and UC. Otherwise bulking products advised and a modified diet. - Amb External Referral To Gastroenterology    4. Encounter to establish care      5. Chronic pain of both knees  Not improved, likely partially due to obesity and work duties. Referred back to PT to address. Use of OTC agents advised otherwise.   [facility] - Physical Therapy at [facility]          Follow-up and Dispositions    Return for OV 4 wk- BMI, LBP, fatigue. Specific pt instructions until next visit: call if any problems    Subjective:   Carmen Grant is a 28 y.o. female who was seen for New Patient (Re Establishing Care. Last seen March of 2019)      Pt here to re-establish care. Last seen 2019 for depressed mood and menstrual irregularity. Lost to care due to trying a different provider. Presents today concerned about feeling very fatigued. Associated with chronic diarrhea twice daily and chest pain. Tried eating more balanced and sleeping more. Seen in ER for CP, non-cardiac with labs.

## 2023-09-29 ENCOUNTER — HOSPITAL ENCOUNTER (OUTPATIENT)
Dept: PHYSICAL THERAPY | Facility: HOSPITAL | Age: 33
Setting detail: RECURRING SERIES
End: 2023-09-29
Payer: COMMERCIAL

## 2023-09-29 PROCEDURE — 97112 NEUROMUSCULAR REEDUCATION: CPT

## 2023-09-29 PROCEDURE — 97161 PT EVAL LOW COMPLEX 20 MIN: CPT

## 2023-09-29 PROCEDURE — 97110 THERAPEUTIC EXERCISES: CPT

## 2023-09-29 NOTE — THERAPY EVALUATION
Physical Therapy at [facility],   a part of [address] [place] [address], Formerly [facility] [address], [address]  Phone: [phone]  Fax: [phone]           PHYSICAL THERAPY - MEDICARE EVALUATION/PLAN OF CARE NOTE (updated 3/23)      Date: 2023          Patient Name:  Trinity Ennis :  1990   Medical   Diagnosis:  Acute low back pain without sciatica, unspecified back pain laterality [M54.50]  Chronic pain of both knees [M25.561, M25.562, G89.29] Treatment Diagnosis:  M25.561  RIGHT KNEE PAIN and M25.562  LEFT KNEE PAIN  and M54.59  OTHER LOWER BACK PAIN    Referral Source: JYOTHI Saldana -* Provider #:  [id number]                Insurance: Payor: Ciro Degroot / Plan: Alycia Minor / Product Type: *No Product type* /      Patient  verified yes     Visit #   Current  / Total 1 20   Time   In / Out [id number]   Total Treatment Time 55   Total Timed Codes 25   1:1 Treatment Time 25      [facility] Totals Reminder:  bill using total billable   min of TIMED therapeutic procedures and modalities. 8-22 min = 1 unit; 23-37 min = 2 units; 38-52 min = 3 units;  53-67 min = 4 units; 68-82 min = 5 units           SUBJECTIVE  Pain Level (0-10 scale): 3/10  []constant [x]intermittent []improving []worsening []no change since onset    Any medication changes, allergies to medications, adverse drug reactions, diagnosis change, or new procedure performed?: [x] No    [] Yes (see summary sheet for update)  Medications: Verified on Patient Summary List    Subjective functional status/changes:     \"Nothing happened but the pain got so bad. \"    Start of Care: 2023  Onset Date: 6 months  Current symptoms/Complaints: low back pain and B knee pain,   Mechanism of Injury: Patient reports that she noticed progressively worsening back pain over the past 6 months. She has been out of work for the past 60 days and is planning to return to work on 2023.  Her left knee

## 2023-10-04 ENCOUNTER — HOSPITAL ENCOUNTER (OUTPATIENT)
Dept: PHYSICAL THERAPY | Facility: HOSPITAL | Age: 33
Setting detail: RECURRING SERIES
End: 2023-10-04
Payer: COMMERCIAL

## 2023-10-06 ENCOUNTER — HOSPITAL ENCOUNTER (OUTPATIENT)
Dept: PHYSICAL THERAPY | Facility: HOSPITAL | Age: 33
Setting detail: RECURRING SERIES
Discharge: HOME | End: 2023-10-09
Payer: COMMERCIAL

## 2023-10-06 PROCEDURE — 97112 NEUROMUSCULAR REEDUCATION: CPT

## 2023-10-06 PROCEDURE — 97110 THERAPEUTIC EXERCISES: CPT

## 2023-10-06 NOTE — PROGRESS NOTES
PHYSICAL THERAPY - MEDICARE DAILY TREATMENT NOTE (updated 3/23)      Date: 10/6/2023          Patient Name:  Doug Donnelly :  1990   Medical   Diagnosis:  Acute low back pain without sciatica, unspecified back pain laterality [M54.50]  Chronic pain of both knees [M25.561, M25.562, G89.29] Treatment Diagnosis:  M25.561  RIGHT KNEE PAIN and M25.562  LEFT KNEE PAIN  and M54.59  OTHER LOWER BACK PAIN    Referral Source: JYOTHI Peres -* Insurance:   Payor: DEYANIRA / Plan: [place] / Product Type: *No Product type* /                     Patient  verified yes     Visit #   Current  / Total 2 20   Time   In / Out [id number] 0809   Total Treatment Time 60   Total Timed Codes 60   1:1 Treatment Time 54      [facility] Totals Reminder:  bill using total billable   min of TIMED therapeutic procedures and modalities. 8-22 min = 1 unit; 23-37 min = 2 units; 38-52 min = 3 units; 53-67 min = 4 units; 68-82 min = 5 units            SUBJECTIVE    Pain Level (0-10 scale): 0/10    Any medication changes, allergies to medications, adverse drug reactions, diagnosis change, or new procedure performed?: [x] No    [] Yes (see summary sheet for update)  Medications: Verified on Patient Summary List    Subjective functional status/changes:     \"I lost the sheet for the exercises but I did work out this morning. \"    OBJECTIVE      Therapeutic Procedures: Tx Min Billable or 1:1 Min (if diff from Tx Min) Procedure, Rationale, Specifics   35 30 67080 Therapeutic Exercise (timed):  increase ROM, strength, coordination, balance, and proprioception to improve patient's ability to progress to PLOF and address remaining functional goals.  (see flow sheet as applicable)     Details if applicable:      58856 Neuromuscular Re-Education (timed):  improve balance, coordination, kinesthetic sense, posture, core stability and proprioception to improve patient's ability to develop conscious control of individual muscles and awareness

## 2023-10-09 ENCOUNTER — TELEPHONE (OUTPATIENT)
Facility: CLINIC | Age: 33
End: 2023-10-09

## 2023-10-10 NOTE — TELEPHONE ENCOUNTER
Arielle called on 10/10/23 @9:42 a m [facility] for pt to call and let us know if she wants letter faxed or will

## 2023-10-11 ENCOUNTER — HOSPITAL ENCOUNTER (OUTPATIENT)
Dept: PHYSICAL THERAPY | Facility: HOSPITAL | Age: 33
Setting detail: RECURRING SERIES
Discharge: HOME | End: 2023-10-14
Payer: COMMERCIAL

## 2023-10-11 PROCEDURE — 97112 NEUROMUSCULAR REEDUCATION: CPT

## 2023-10-11 PROCEDURE — 97110 THERAPEUTIC EXERCISES: CPT

## 2023-10-11 PROCEDURE — G0283 ELEC STIM OTHER THAN WOUND: HCPCS

## 2023-10-11 NOTE — PROGRESS NOTES
PHYSICAL THERAPY - MEDICARE DAILY TREATMENT NOTE (updated 3/23)      Date: 10/11/2023          Patient Name:  Carmen Grant :  1990   Medical   Diagnosis:  Acute low back pain without sciatica, unspecified back pain laterality [M54.50]  Chronic pain of both knees [M25.561, M25.562, G89.29] Treatment Diagnosis:  M25.561  RIGHT KNEE PAIN and M25.562  LEFT KNEE PAIN  and M54.59  OTHER LOWER BACK PAIN    Referral Source: JYOTHI Majano -* Insurance:   Payor: DEYANIRA / Plan: Boo Frias / Product Type: *No Product type* /                     Patient  verified yes     Visit #   Current  / Total 3 20   Time   In / Out 7:47A 9:10A   Total Treatment Time 83   Total Timed Codes 68   1:1 Treatment Time 48      [facility] Totals Reminder:  bill using total billable   min of TIMED therapeutic procedures and modalities. 8-22 min = 1 unit; 23-37 min = 2 units; 38-52 min = 3 units; 53-67 min = 4 units; 68-82 min = 5 units            SUBJECTIVE    Pain Level (0-10 scale): 0/10    Any medication changes, allergies to medications, adverse drug reactions, diagnosis change, or new procedure performed?: [x] No    [] Yes (see summary sheet for update)  Medications: Verified on Patient Summary List    Subjective functional status/changes:     Pt reports back is doing better knee still hurts    OBJECTIVE      Therapeutic Procedures: Tx Min Billable or 1:1 Min (if diff from Tx Min) Procedure, Rationale, Specifics   45 30 15818 Therapeutic Exercise (timed):  increase ROM, strength, coordination, balance, and proprioception to improve patient's ability to progress to PLOF and address remaining functional goals.  (see flow sheet as applicable)     Details if applicable:      52305 Neuromuscular Re-Education (timed):  improve balance, coordination, kinesthetic sense, posture, core stability and proprioception to improve patient's ability to develop conscious control of individual muscles and awareness of position of

## 2023-10-13 ENCOUNTER — HOSPITAL ENCOUNTER (OUTPATIENT)
Dept: PHYSICAL THERAPY | Facility: HOSPITAL | Age: 33
Setting detail: RECURRING SERIES
End: 2023-10-13
Payer: COMMERCIAL

## 2023-10-17 ENCOUNTER — OFFICE VISIT (OUTPATIENT)
Facility: CLINIC | Age: 33
End: 2023-10-17
Payer: COMMERCIAL

## 2023-10-17 VITALS
TEMPERATURE: 98.6 F | HEIGHT: 63 IN | HEART RATE: 75 BPM | BODY MASS INDEX: 45.54 KG/M2 | WEIGHT: 257 LBS | DIASTOLIC BLOOD PRESSURE: 70 MMHG | SYSTOLIC BLOOD PRESSURE: 124 MMHG | OXYGEN SATURATION: 97 % | RESPIRATION RATE: 17 BRPM

## 2023-10-17 DIAGNOSIS — M25.561 CHRONIC PAIN OF BOTH KNEES: ICD-10-CM

## 2023-10-17 DIAGNOSIS — G89.29 CHRONIC PAIN OF BOTH KNEES: ICD-10-CM

## 2023-10-17 DIAGNOSIS — F32.1 DEPRESSION, MAJOR, SINGLE EPISODE, MODERATE (HCC): ICD-10-CM

## 2023-10-17 DIAGNOSIS — M25.562 CHRONIC PAIN OF BOTH KNEES: ICD-10-CM

## 2023-10-17 DIAGNOSIS — M54.50 ACUTE LOW BACK PAIN WITHOUT SCIATICA, UNSPECIFIED BACK PAIN LATERALITY: ICD-10-CM

## 2023-10-17 DIAGNOSIS — R53.82 CHRONIC FATIGUE: Primary | ICD-10-CM

## 2023-10-17 PROCEDURE — 99213 OFFICE O/P EST LOW 20 MIN: CPT | Performed by: NURSE PRACTITIONER

## 2023-10-17 SDOH — ECONOMIC STABILITY: INCOME INSECURITY: HOW HARD IS IT FOR YOU TO PAY FOR THE VERY BASICS LIKE FOOD, HOUSING, MEDICAL CARE, AND HEATING?: SOMEWHAT HARD

## 2023-10-17 SDOH — ECONOMIC STABILITY: FOOD INSECURITY: WITHIN THE PAST 12 MONTHS, YOU WORRIED THAT YOUR FOOD WOULD RUN OUT BEFORE YOU GOT MONEY TO BUY MORE.: SOMETIMES TRUE

## 2023-10-17 SDOH — ECONOMIC STABILITY: FOOD INSECURITY: WITHIN THE PAST 12 MONTHS, THE FOOD YOU BOUGHT JUST DIDN'T LAST AND YOU DIDN'T HAVE MONEY TO GET MORE.: NEVER TRUE

## 2023-10-17 SDOH — ECONOMIC STABILITY: HOUSING INSECURITY
IN THE LAST 12 MONTHS, WAS THERE A TIME WHEN YOU DID NOT HAVE A STEADY PLACE TO SLEEP OR SLEPT IN A SHELTER (INCLUDING NOW)?: NO

## 2023-10-17 SDOH — ECONOMIC STABILITY: TRANSPORTATION INSECURITY
IN THE PAST 12 MONTHS, HAS LACK OF TRANSPORTATION KEPT YOU FROM MEETINGS, WORK, OR FROM GETTING THINGS NEEDED FOR DAILY LIVING?: NO

## 2023-10-17 NOTE — PATIENT INSTRUCTIONS
Most important to weight loss is developing a healthy lifestyle of a healthy diet and exercise at least THREE TIMES WEEKLY. Call your insurance company to see if bariatric/obesity medicine visits are COVERED. Then check to see if each of these meds are covered and the OUT OF POCKET cost to you for each if covered. Kurtis Lay the ones that are and cross the ones that are NOT) WEGOVY, QSYMIA, SAXENDA, PLENITY, CONTRAVE, or Phentermine are covered. Then let me know which you are able to get and we can determine the best fit at your next appt.

## 2023-10-17 NOTE — PROGRESS NOTES
Pt is here for   Chief Complaint   Patient presents with    Follow-up     4 weeks for BMI, LBP, Fatigue    Knee Pain     Bilateral      1. Have you been to the ER, urgent care clinic since your last visit? Hospitalized since your last visit? No    2. Have you seen or consulted any other health care providers outside of the [address] [place] [address] Avenue since your last visit? Include any pap smears or colon screening.  No

## 2023-10-17 NOTE — PROGRESS NOTES
Santiago Robbins 1990 is a 28 y.o. female, Established patient, here for evaluation of the following chief complaint(s):  Follow-up (4 weeks for BMI, LBP, Fatigue) and Knee Pain (Bilateral )      ASSESSMENT/PLAN:  Below is the assessment and plan developed based on review of pertinent history, physical exam, labs, studies, and medications. 1. Chronic fatigue  Stable, asked to get labs as last ordered. 2. Acute low back pain without sciatica, unspecified back pain laterality  Stable, continue PT and focus on wt loss. 3. BMI 45.0-49.9, adult ([address])  Stable, given list of meds to look into and asked to get labs. May refer for MNT also pending labs. 4. Depression, major, single episode, moderate (HCC)  Stable, managed by psych. Continue zoloft, trazodone, and abilify. 5. Chronic pain of both knees  Stable, wt loss advised. On this date 10/17/2023 I have spent 24 minutes reviewing previous notes, test results and face to face with the patient discussing the diagnosis and importance of compliance with the treatment plan as well as documenting on the day of the visit. Follow-up and Dispositions    Return in about 6 weeks (around 11/28/2023) for OV- BMI/Wt loss visit. Pt asked to complete follow by next visit: Call if any problems    SUBJECTIVE/OBJECTIVE:  HPI    Pt presents to f/u fatigue and LBP. Worse today with knee pain after working last night. Recently purchased new shoes to help. Taking IBU and foam roller while at work. Denies side effects from medication. Feels the same. Weight Loss Counseling:  Pt here to discuss elevated BMI. Would like to lose weight. Eating 3 x daily. Exercising 0, but works at [employer] with [address] steps. Also going to [employer] for boxing cage x 3 for 35 minutes. Following diet of intermittent fasting and low-carb diet. Feels she is a binge eater.   Last Weight Metrics:      10/17/2023    11:46 AM 9/8/2023    11:42 AM 2/14/2023     9:44 AM 5/23/2022

## 2023-10-18 ENCOUNTER — APPOINTMENT (OUTPATIENT)
Dept: PHYSICAL THERAPY | Facility: HOSPITAL | Age: 33
End: 2023-10-18
Payer: COMMERCIAL

## 2023-10-20 ENCOUNTER — APPOINTMENT (OUTPATIENT)
Dept: PHYSICAL THERAPY | Facility: HOSPITAL | Age: 33
End: 2023-10-20
Payer: COMMERCIAL

## 2023-10-25 ENCOUNTER — APPOINTMENT (OUTPATIENT)
Dept: PHYSICAL THERAPY | Facility: HOSPITAL | Age: 33
End: 2023-10-25
Payer: COMMERCIAL

## 2023-10-27 ENCOUNTER — APPOINTMENT (OUTPATIENT)
Dept: PHYSICAL THERAPY | Facility: HOSPITAL | Age: 33
End: 2023-10-27
Payer: COMMERCIAL

## 2023-10-27 PROBLEM — E55.9 VITAMIN D INSUFFICIENCY: Status: ACTIVE | Noted: 2023-10-27

## 2024-05-20 ENCOUNTER — OFFICE VISIT (OUTPATIENT)
Facility: CLINIC | Age: 34
End: 2024-05-20
Payer: COMMERCIAL

## 2024-05-20 VITALS
TEMPERATURE: 98.6 F | WEIGHT: 278.6 LBS | HEART RATE: 80 BPM | BODY MASS INDEX: 49.36 KG/M2 | RESPIRATION RATE: 18 BRPM | SYSTOLIC BLOOD PRESSURE: 137 MMHG | OXYGEN SATURATION: 100 % | HEIGHT: 63 IN | DIASTOLIC BLOOD PRESSURE: 80 MMHG

## 2024-05-20 DIAGNOSIS — R53.82 CHRONIC FATIGUE: ICD-10-CM

## 2024-05-20 DIAGNOSIS — N92.6 IRREGULAR MENSES: ICD-10-CM

## 2024-05-20 DIAGNOSIS — L68.0 HIRSUTISM: Primary | ICD-10-CM

## 2024-05-20 PROCEDURE — 99212 OFFICE O/P EST SF 10 MIN: CPT | Performed by: NURSE PRACTITIONER

## 2024-05-20 ASSESSMENT — PATIENT HEALTH QUESTIONNAIRE - PHQ9
10. IF YOU CHECKED OFF ANY PROBLEMS, HOW DIFFICULT HAVE THESE PROBLEMS MADE IT FOR YOU TO DO YOUR WORK, TAKE CARE OF THINGS AT HOME, OR GET ALONG WITH OTHER PEOPLE: SOMEWHAT DIFFICULT
SUM OF ALL RESPONSES TO PHQ QUESTIONS 1-9: 21
6. FEELING BAD ABOUT YOURSELF - OR THAT YOU ARE A FAILURE OR HAVE LET YOURSELF OR YOUR FAMILY DOWN: NEARLY EVERY DAY
9. THOUGHTS THAT YOU WOULD BE BETTER OFF DEAD, OR OF HURTING YOURSELF: NOT AT ALL
7. TROUBLE CONCENTRATING ON THINGS, SUCH AS READING THE NEWSPAPER OR WATCHING TELEVISION: NOT AT ALL
8. MOVING OR SPEAKING SO SLOWLY THAT OTHER PEOPLE COULD HAVE NOTICED. OR THE OPPOSITE, BEING SO FIGETY OR RESTLESS THAT YOU HAVE BEEN MOVING AROUND A LOT MORE THAN USUAL: NEARLY EVERY DAY
SUM OF ALL RESPONSES TO PHQ QUESTIONS 1-9: 21
3. TROUBLE FALLING OR STAYING ASLEEP: NEARLY EVERY DAY
4. FEELING TIRED OR HAVING LITTLE ENERGY: NEARLY EVERY DAY
SUM OF ALL RESPONSES TO PHQ QUESTIONS 1-9: 21
SUM OF ALL RESPONSES TO PHQ9 QUESTIONS 1 & 2: 6
SUM OF ALL RESPONSES TO PHQ QUESTIONS 1-9: 21
2. FEELING DOWN, DEPRESSED OR HOPELESS: NEARLY EVERY DAY
5. POOR APPETITE OR OVEREATING: NEARLY EVERY DAY
1. LITTLE INTEREST OR PLEASURE IN DOING THINGS: NEARLY EVERY DAY

## 2024-05-20 ASSESSMENT — COLUMBIA-SUICIDE SEVERITY RATING SCALE - C-SSRS
2. HAVE YOU ACTUALLY HAD ANY THOUGHTS OF KILLING YOURSELF?: NO
6. HAVE YOU EVER DONE ANYTHING, STARTED TO DO ANYTHING, OR PREPARED TO DO ANYTHING TO END YOUR LIFE?: NO
1. WITHIN THE PAST MONTH, HAVE YOU WISHED YOU WERE DEAD OR WISHED YOU COULD GO TO SLEEP AND NOT WAKE UP?: NO

## 2024-05-20 NOTE — PROGRESS NOTES
Nancie Cespedes is a 33 y.o. female  Chief Complaint   Patient presents with    Other     Pt states she is always tired, haven't had a period in about a year, pt also states she is constantly gaining weight.      /80 (Site: Right Upper Arm, Position: Sitting, Cuff Size: Thigh)   Pulse 80   Temp 98.6 °F (37 °C)   Resp 18   Ht 1.6 m (5' 3\")   Wt 126.4 kg (278 lb 9.6 oz)   SpO2 100%   BMI 49.35 kg/m²

## 2024-05-20 NOTE — PATIENT INSTRUCTIONS
Call DINO Yao GYN at [facility]  [phone]    Call [phone] to schedule your vaginal ultrasound.

## 2024-05-20 NOTE — PROGRESS NOTES
Nancie Cespedes 1990 is a 33 y.o. female, Established patient, here for evaluation of the following chief complaint(s):  Other (Pt states she is always tired, haven't had a period in about a year, pt also states she is constantly gaining weight. )      ASSESSMENT/PLAN:  Below is the assessment and plan developed based on review of pertinent history, physical exam, labs, studies, and medications.     Diagnosis Orders   1. Hirsutism  US NON OB TRANSVAGINAL    Amb External Referral To Gynecology      2. Irregular menses  US NON OB TRANSVAGINAL    Amb External Referral To Gynecology      3. Chronic fatigue  US NON OB TRANSVAGINAL    Amb External Referral To Gynecology      4. BMI 45.0-49.9, adult (HCC)              MDM:suspecting PCOS, US ordered. Pt already has appt scheduled with [facility] next week. Advised to keep. Labs deferred for them. Advised to ask for wt mgt also and be open to OCP to restart menses since plans for fertility at some point. May consider metformin or other agent for wt mgt at fu if not started already.             Follow-up and Dispositions    Return in about 3 months (around 8/20/2024) for Physical with labs.         Pt asked to complete follow by next visit: Call if any problems    SUBJECTIVE/OBJECTIVE:  HPI    Pt presents today concerned with feeling always tired. Weight loss help. No menstrual. Hairy chin and chest.    Aware of vitamin D deficiency, but forgot about it and had not started taking oral supplement yet.       5/20/2024     3:37 PM 10/17/2023    11:46 AM 9/8/2023    11:42 AM 2/14/2023     9:44 AM 5/23/2022     1:47 PM   Weight Metrics   Weight 278 lb 9.6 oz 257 lb 251 lb 234 lb 8 oz 225 lb   BMI (Calculated) 49.5 kg/m2 45.6 kg/m2 44.6 kg/m2 41.6 kg/m2 39.9 kg/m2           Review of Systems  Constitutional: negative for fevers, chills, anorexia and weight loss  Respiratory:  negative for cough, hemoptysis, dyspnea, and wheezing  CV:   negative for chest pain, palpitations, and lower

## 2024-06-11 ENCOUNTER — HOSPITAL ENCOUNTER (INPATIENT)
Facility: HOSPITAL | Age: 34
LOS: 1 days | Discharge: HOME | DRG: 885 | End: 2024-06-12
Attending: STUDENT IN AN ORGANIZED HEALTH CARE EDUCATION/TRAINING PROGRAM | Admitting: PSYCHIATRY & NEUROLOGY
Payer: MEDICAID

## 2024-06-11 DIAGNOSIS — R45.851 SUICIDAL IDEATION: Primary | ICD-10-CM

## 2024-06-11 PROBLEM — F25.9 SCHIZOAFFECTIVE DISORDER (HCC): Status: ACTIVE | Noted: 2024-06-11

## 2024-06-11 LAB
ALBUMIN SERPL-MCNC: 3.4 G/DL (ref 3.5–5)
ALBUMIN/GLOB SERPL: 0.8 (ref 1.1–2.2)
ALP SERPL-CCNC: 68 U/L (ref 45–117)
ALT SERPL-CCNC: 31 U/L (ref 12–78)
AMPHET UR QL SCN: NEGATIVE
ANION GAP SERPL CALC-SCNC: 5 MMOL/L (ref 5–15)
APPEARANCE UR: CLEAR
AST SERPL-CCNC: 19 U/L (ref 15–37)
BACTERIA URNS QL MICRO: NEGATIVE /HPF
BARBITURATES UR QL SCN: NEGATIVE
BASOPHILS # BLD: 0 K/UL (ref 0–0.1)
BASOPHILS NFR BLD: 0 % (ref 0–1)
BENZODIAZ UR QL: NEGATIVE
BILIRUB SERPL-MCNC: 0.2 MG/DL (ref 0.2–1)
BILIRUB UR QL: NEGATIVE
BUN SERPL-MCNC: 11 MG/DL (ref 6–20)
BUN/CREAT SERPL: 11 (ref 12–20)
CALCIUM SERPL-MCNC: 9.3 MG/DL (ref 8.5–10.1)
CANNABINOIDS UR QL SCN: NEGATIVE
CHLORIDE SERPL-SCNC: 103 MMOL/L (ref 97–108)
CO2 SERPL-SCNC: 29 MMOL/L (ref 21–32)
COCAINE UR QL SCN: NEGATIVE
COLOR UR: NORMAL
CREAT SERPL-MCNC: 0.96 MG/DL (ref 0.55–1.02)
DIFFERENTIAL METHOD BLD: ABNORMAL
EOSINOPHIL # BLD: 0.1 K/UL (ref 0–0.4)
EOSINOPHIL NFR BLD: 1 % (ref 0–7)
EPITH CASTS URNS QL MICRO: NORMAL /LPF
ERYTHROCYTE [DISTWIDTH] IN BLOOD BY AUTOMATED COUNT: 15.5 % (ref 11.5–14.5)
ETHANOL SERPL-MCNC: <10 MG/DL (ref 0–0.08)
GLOBULIN SER CALC-MCNC: 4.2 G/DL (ref 2–4)
GLUCOSE SERPL-MCNC: 116 MG/DL (ref 65–100)
GLUCOSE UR STRIP.AUTO-MCNC: NEGATIVE MG/DL
HCG UR QL: NEGATIVE
HCG UR QL: NEGATIVE
HCT VFR BLD AUTO: 36.6 % (ref 35–47)
HGB BLD-MCNC: 11.2 G/DL (ref 11.5–16)
HGB UR QL STRIP: NEGATIVE
IMM GRANULOCYTES # BLD AUTO: 0 K/UL (ref 0–0.04)
IMM GRANULOCYTES NFR BLD AUTO: 0 % (ref 0–0.5)
KETONES UR QL STRIP.AUTO: NEGATIVE MG/DL
LEUKOCYTE ESTERASE UR QL STRIP.AUTO: NEGATIVE
LYMPHOCYTES # BLD: 3.3 K/UL (ref 0.8–3.5)
LYMPHOCYTES NFR BLD: 40 % (ref 12–49)
Lab: NORMAL
MCH RBC QN AUTO: 24.8 PG (ref 26–34)
MCHC RBC AUTO-ENTMCNC: 30.6 G/DL (ref 30–36.5)
MCV RBC AUTO: 81 FL (ref 80–99)
METHADONE UR QL: NEGATIVE
MONOCYTES # BLD: 0.7 K/UL (ref 0–1)
MONOCYTES NFR BLD: 8 % (ref 5–13)
NEUTS SEG # BLD: 4.1 K/UL (ref 1.8–8)
NEUTS SEG NFR BLD: 51 % (ref 32–75)
NITRITE UR QL STRIP.AUTO: NEGATIVE
NRBC # BLD: 0 K/UL (ref 0–0.01)
NRBC BLD-RTO: 0 PER 100 WBC
OPIATES UR QL: NEGATIVE
PCP UR QL: NEGATIVE
PH UR STRIP: 7.5 (ref 5–8)
PLATELET # BLD AUTO: 278 K/UL (ref 150–400)
PMV BLD AUTO: 11.2 FL (ref 8.9–12.9)
POTASSIUM SERPL-SCNC: 3.9 MMOL/L (ref 3.5–5.1)
PROT SERPL-MCNC: 7.6 G/DL (ref 6.4–8.2)
PROT UR STRIP-MCNC: NEGATIVE MG/DL
RBC # BLD AUTO: 4.52 M/UL (ref 3.8–5.2)
RBC #/AREA URNS HPF: NORMAL /HPF (ref 0–5)
SODIUM SERPL-SCNC: 137 MMOL/L (ref 136–145)
SP GR UR REFRACTOMETRY: 1.01
URINE CULTURE IF INDICATED: NORMAL
UROBILINOGEN UR QL STRIP.AUTO: 1 EU/DL (ref 0.2–1)
WBC # BLD AUTO: 8.2 K/UL (ref 3.6–11)
WBC URNS QL MICRO: NORMAL /HPF (ref 0–4)

## 2024-06-11 PROCEDURE — 80053 COMPREHEN METABOLIC PANEL: CPT

## 2024-06-11 PROCEDURE — 81001 URINALYSIS AUTO W/SCOPE: CPT

## 2024-06-11 PROCEDURE — 81025 URINE PREGNANCY TEST: CPT

## 2024-06-11 PROCEDURE — 90791 PSYCH DIAGNOSTIC EVALUATION: CPT

## 2024-06-11 PROCEDURE — 80307 DRUG TEST PRSMV CHEM ANLYZR: CPT

## 2024-06-11 PROCEDURE — 36415 COLL VENOUS BLD VENIPUNCTURE: CPT

## 2024-06-11 PROCEDURE — 99285 EMERGENCY DEPT VISIT HI MDM: CPT

## 2024-06-11 PROCEDURE — 85025 COMPLETE CBC W/AUTO DIFF WBC: CPT

## 2024-06-11 PROCEDURE — 1240000000 HC EMOTIONAL WELLNESS R&B

## 2024-06-11 PROCEDURE — 82077 ASSAY SPEC XCP UR&BREATH IA: CPT

## 2024-06-11 RX ORDER — HALOPERIDOL 5 MG/ML
5 INJECTION INTRAMUSCULAR EVERY 4 HOURS PRN
Status: DISCONTINUED | OUTPATIENT
Start: 2024-06-11 | End: 2024-06-12 | Stop reason: HOSPADM

## 2024-06-11 RX ORDER — ACETAMINOPHEN 325 MG/1
650 TABLET ORAL EVERY 4 HOURS PRN
Status: DISCONTINUED | OUTPATIENT
Start: 2024-06-11 | End: 2024-06-12 | Stop reason: HOSPADM

## 2024-06-11 RX ORDER — HYDROXYZINE HYDROCHLORIDE 25 MG/1
50 TABLET, FILM COATED ORAL 3 TIMES DAILY PRN
Status: DISCONTINUED | OUTPATIENT
Start: 2024-06-11 | End: 2024-06-12 | Stop reason: HOSPADM

## 2024-06-11 RX ORDER — HALOPERIDOL 5 MG/1
5 TABLET ORAL EVERY 4 HOURS PRN
Status: DISCONTINUED | OUTPATIENT
Start: 2024-06-11 | End: 2024-06-12 | Stop reason: HOSPADM

## 2024-06-11 RX ORDER — POLYETHYLENE GLYCOL 3350 17 G/17G
17 POWDER, FOR SOLUTION ORAL DAILY PRN
Status: DISCONTINUED | OUTPATIENT
Start: 2024-06-11 | End: 2024-06-12 | Stop reason: HOSPADM

## 2024-06-11 RX ORDER — MAGNESIUM HYDROXIDE/ALUMINUM HYDROXICE/SIMETHICONE 120; 1200; 1200 MG/30ML; MG/30ML; MG/30ML
30 SUSPENSION ORAL EVERY 6 HOURS PRN
Status: DISCONTINUED | OUTPATIENT
Start: 2024-06-11 | End: 2024-06-12 | Stop reason: HOSPADM

## 2024-06-11 RX ORDER — DIPHENHYDRAMINE HYDROCHLORIDE 50 MG/ML
50 INJECTION INTRAMUSCULAR; INTRAVENOUS EVERY 4 HOURS PRN
Status: DISCONTINUED | OUTPATIENT
Start: 2024-06-11 | End: 2024-06-12 | Stop reason: HOSPADM

## 2024-06-11 RX ORDER — TRAZODONE HYDROCHLORIDE 50 MG/1
50 TABLET ORAL NIGHTLY PRN
Status: DISCONTINUED | OUTPATIENT
Start: 2024-06-11 | End: 2024-06-12 | Stop reason: HOSPADM

## 2024-06-11 ASSESSMENT — LIFESTYLE VARIABLES
HOW OFTEN DO YOU HAVE A DRINK CONTAINING ALCOHOL: NEVER
HOW MANY STANDARD DRINKS CONTAINING ALCOHOL DO YOU HAVE ON A TYPICAL DAY: PATIENT DOES NOT DRINK

## 2024-06-11 ASSESSMENT — SLEEP AND FATIGUE QUESTIONNAIRES
DO YOU USE A SLEEP AID: NO
DO YOU HAVE DIFFICULTY SLEEPING: NO
AVERAGE NUMBER OF SLEEP HOURS: 6

## 2024-06-11 ASSESSMENT — PAIN - FUNCTIONAL ASSESSMENT: PAIN_FUNCTIONAL_ASSESSMENT: NONE - DENIES PAIN

## 2024-06-11 NOTE — ED NOTES
Bedside and Verbal shift change report given to LEO Go (oncoming nurse) by LEO Swenson (offgoing nurse). Report included the following information Nurse Handoff Report, ED Encounter Summary, ED SBAR, Intake/Output, MAR, and Recent Results.

## 2024-06-11 NOTE — PROGRESS NOTES
BSMART assessment completed, and suicide risk level noted to be high. Primary Nurse Leela and Physician Dr Gutierrez notified. Concerns not observed.  Security/Off- has not been notified. Patient needs a 1:1.

## 2024-06-11 NOTE — ED PROVIDER NOTES
currently voluntary. Sitter at bedside. Will continue to monitor while in ED.      ED Course as of 06/13/24 0045   Tue Jun 11, 2024   1843 Plan for voluntary admission, labs ordered  [NM]      ED Course User Index  [NM] Rosi Gutierrez,        Disposition Considerations (Tests not done, Shared Decision Making, Pt Expectation of Test or Tx.):      FINAL IMPRESSION     1. Suicidal ideation          DISPOSITION/PLAN   DISPOSITION Admitted 06/11/2024 09:26:32 PM      Admit Note: Pt is being admitted by psychiatry. The results of their tests and reason(s) for their admission have been discussed with pt and/or available family. They convey agreement and understanding for the need to be admitted and for the admission diagnosis.     PATIENT REFERRED TO:  [facility] ([facility])  [address] [place] [address]  Hours   Monday -- Friday: 8:00am - 5:00pm  Go on 6/24/2024  Follow up with your appointment with your behavioral health provider on June 24th.    [facility]  [address] [address]    Rapid Access Monday-Friday 8:00am-2:00pm    [phone]  Go to  As needed for outpatient counseling and case management services.    Crisis Resources  National Suicide Prevention Lifeline:  24/7 hotline, 7 (838) 755-3502  Warm Line:  Monday-Friday 9 AM-9 PM, Saturday-Sunday 5 PM- 9 PM  7 (257) 922-4795  Crisis Text Line:  24/7 crisis text messaging.  Text HOME to 658273.  Substance Abuse 24Hour Hotl...  Follow up           DISCHARGE MEDICATIONS:     Medication List        CHANGE how you take these medications      * ARIPiprazole  MG Srer  Commonly known as: ABILIFY MAINTENA  What changed: Another medication with the same name was added. Make sure you understand how and when to take each.     * ARIPiprazole 20 MG tablet  Commonly known as: ABILIFY  Take 1 tablet by mouth daily for 60 doses  What changed: You were already taking a medication with the same name, and this prescription was added. Make

## 2024-06-11 NOTE — ED NOTES
Pt presents to ED complaining of suicidal thought with a plan but no intent to follow through with it. Pt states she recently moved in to a place by herself and has been feeling really lonely and sad. Pt states she has been stood up a lot recently and all her friends have other things going on in their lives and she feels lonely. Pt states she has a hx of schizoaffective disorder and gets monthly injections of Abilify but feels like she hasn't been getting them consistently on time and isn't sure if that is what is affecting her mood. Pt states she came in today because she had a mental breakdown at work and couldn't stop crying. Pt states she would like to \"go the medication route\" and see if that helps her feel better. Pt also states she feels like she is hearing voices that make her feel paranoid around others and question if people like her. Pt denies HI or visual hallucinations. Pt is alert and oriented x 4, RR even and unlabored, skin is warm and dry. Pt appears in NAD at this time. Assessment completed and pt updated on plan of care.  Call bell in reach.  Emergency Department Nursing Plan of Care  The Nursing Plan of Care is developed from the Nursing assessment and Emergency Department Attending provider initial evaluation.  The plan of care may be reviewed in the “ED Provider note”.  The Plan of Care was developed with the following considerations:  Patient / Family readiness to learn indicated by:Refer to Medical chart in [facility]  Persons(s) to be included in education: Refer to Medical chart in [facility]  Barriers to Learning/Limitations:Normal

## 2024-06-12 VITALS
WEIGHT: 280 LBS | BODY MASS INDEX: 49.61 KG/M2 | HEART RATE: 63 BPM | TEMPERATURE: 97.5 F | DIASTOLIC BLOOD PRESSURE: 75 MMHG | HEIGHT: 63 IN | OXYGEN SATURATION: 100 % | SYSTOLIC BLOOD PRESSURE: 124 MMHG | RESPIRATION RATE: 17 BRPM

## 2024-06-12 PROCEDURE — 6370000000 HC RX 637 (ALT 250 FOR IP): Performed by: PSYCHIATRY & NEUROLOGY

## 2024-06-12 PROCEDURE — 99223 1ST HOSP IP/OBS HIGH 75: CPT | Performed by: PSYCHIATRY & NEUROLOGY

## 2024-06-12 RX ORDER — SERTRALINE HYDROCHLORIDE 100 MG/1
200 TABLET, FILM COATED ORAL DAILY
Qty: 60 TABLET | Refills: 1 | Status: SHIPPED | OUTPATIENT
Start: 2024-06-12

## 2024-06-12 RX ORDER — ARIPIPRAZOLE 20 MG/1
20 TABLET ORAL DAILY
Qty: 30 TABLET | Refills: 1 | Status: SHIPPED | OUTPATIENT
Start: 2024-06-13 | End: 2024-08-12

## 2024-06-12 RX ADMIN — SERTRALINE HYDROCHLORIDE 200 MG: 50 TABLET ORAL at 12:34

## 2024-06-12 RX ADMIN — ARIPIPRAZOLE 20 MG: 15 TABLET ORAL at 12:34

## 2024-06-12 NOTE — H&P
the following diagnoses:  Schizoaffective disorder (HCC)  ASSESSMENT: the patient presents with worsening suicidal ideation in the setting of social stressors. She has previously been admitted to [facility] hospital in the past but presently is functioning well in the community.   PLAN:  - RESTART home regimen  - IGM therapy as tolerated  - Expand database / obtain collateral  - Dispo planning       I will obtain labwork for all medications for which routine monitoring is recommended and agents that require intense monitoring for toxicity as deemed appropriate based on current medication side effects and pharmacodynamically determined drug 1/2 lives.    A coordinated, multidisplinary treatment team (includes the nurse, unit pharmacist,  and writer) round was conducted for this initial evaluation with the patient present.     The following regarding medications was addressed during rounds with patient: the risks and benefits of the proposed medication. The patient was given the opportunity to ask questions. Informed consent given to the use of the above medications.    I will continue to adjust psychiatric and non-psychiatric medications (see above \"medication\" section and orders section for details) as deemed appropriate & based upon diagnoses and response to treatment. I have reviewed admission (and previous/old) labs and medical tests in the EHR and or transferring hospital documents. I will continue to order blood tests/labs and diagnostic tests as deemed appropriate and review results as they become available (see orders for details). I have reviewed old psychiatric and medical records available in the EHR. I Will order additional psychiatric records from other institutions to further elucidate the nature of patient's psychopathology and review once available.    I will gather additional collateral information from friends, family and o/p treatment team to further elucidate the nature of patient's

## 2024-06-12 NOTE — BH NOTE
This writer spoke with patient's mother and she reports that she has no concerns for patient to discharge and she will be able to monitor for the next 48 hours.        JUAN LUIS Li

## 2024-06-12 NOTE — BH NOTE
PSYCHOSOCIAL ASSESSMENT  :Patient identifying info:   Nancie Cespedes is a 33 y.o., female admitted 6/11/2024  5:53 PM     Presenting problem and precipitating factors: Patient is a 33 year old black female with a hx of Schizoaffective Disorder who voluntarily admitted herself to the hospital due to SI with plan to jump off a bridge. Patient reports that she starting having SI after isolation in her home over the weekend. Patient denies current SI no plan or intent. Patient reports that she has not been taking her Abilify and Zoloft as prescribed. Patient reports that her goal is to get back on her medications and discharge. Patient also reports that she broke up with her fiance in April and has been struggling since.    Mental status assessment: Alert and oriented x 4    Strengths/Recreation/Coping Skills:stable housing, active insurance, employment    Collateral information: Mom Angeles Cespedes  Parent  [phone]    Current psychiatric /substance abuse providers and contact info: [facility]     Previous psychiatric/substance abuse providers and response to treatment: [facility] 2020    Family history of mental illness or substance abuse: denies    Substance abuse history:  UDS Negative BAL0  Social History     Tobacco Use    Smoking status: Never     Passive exposure: Never    Smokeless tobacco: Never   Substance Use Topics    Alcohol use: Yes       History of biomedical complications associated with substance abuse: none    Patient's current acceptance of treatment or motivation for change: fair    Family constellation: Patient is single never  with no children    Is significant other involved? no    Describe support system: Mom    Describe living arrangements and home environment: lives alone    GUARDIAN/POA: No    Guardian Name:     Guardian Contact:     Health issues:     Trauma history: yes    Legal issues: none    History of  service: none    Financial status:

## 2024-06-12 NOTE — BH NOTE
Discharge arranged between treatment team, patient, and patient's mother. Pt A&Ox4. Denies SI/HI/AVH. Pt provided with discharge instructions, belongings, and valuables. Pt voiced understanding. Pt discharged to home via personal vehicle with mother.

## 2024-06-12 NOTE — GROUP NOTE
Group Therapy Note    Date: 6/12/2024    Group Start Time: 1100  Group End Time: 1200  Group Topic: Topic Group    [facility] 3 ACUTE BEHAV [facility]    Naomie Cuellar        Group Therapy Note    Attendees: 5       Patient's Goal:  To participate in relaxation activity    Notes:  Pt did not attend session    Discipline Responsible: Recreational Therapist      Signature:  NAOMIE CUELLAR

## 2024-06-12 NOTE — BH NOTE
This writer attempted to contact patient's mother Angeles Cespedes. No answer left vm. Safety planning will need to be in place in order for patient to discharge today.  Parent  [phone]      JUAN LUIS Li

## 2024-06-12 NOTE — PLAN OF CARE
Problem: Discharge Planning  Goal: Discharge to home or other facility with appropriate resources  Outcome: HH/HSPC Resolved Met     Problem: Self Harm/Suicidality  Goal: Will have no self-injury during hospital stay  Description: INTERVENTIONS:  1.  Ensure constant observer at bedside with Q15M safety checks  2.  Maintain a safe environment  3.  Secure patient belongings  4.  Ensure family/visitors adhere to safety recommendations  5.  Ensure safety tray has been added to patient's diet order  6.  Every shift and PRN: Re-assess suicidal risk via Frequent Screener    6/12/2024 1143 by Leslie Gunter RN  Outcome: HH/HSPC Resolved Met  6/12/2024 1001 by Leslie Gunter RN  Outcome: Progressing  6/11/2024 2354 by Yane Chirinos RN  Outcome: Progressing     Problem: Depression  Goal: Will be euthymic at discharge  Description: INTERVENTIONS:  1. Administer medication as ordered  2. Provide emotional support via 1:1 interaction with staff  3. Encourage involvement in milieu/groups/activities  4. Monitor for social isolation  6/12/2024 1143 by Leslie Gunter RN  Outcome: HH/HSPC Resolved Met  6/12/2024 1001 by Leslie Gunter RN  Outcome: Progressing     Problem: Behavior  Goal: Pt/Family maintain appropriate behavior and adhere to behavioral management agreement, if implemented  Description: INTERVENTIONS:  1. Assess patient/family's coping skills and  non-compliant behavior (including use of illegal substances)  2. Notify security of behavior or suspected illegal substances which indicate the need for search of the family and/or belongings  3. Encourage verbalization of thoughts and concerns in a socially appropriate manner  4. Utilize positive, consistent limit setting strategies supporting safety of patient, staff and others  5. Encourage participation in the decision making process about the behavioral management agreement  6. If a visitor's behavior poses a threat to safety call refer to organization policy.  7.

## 2024-06-12 NOTE — ED NOTES
TRANSFER - OUT REPORT:    Verbal report given to Yane on Nancie Cespedes  being [facility] for routine progression of patient care       Report consisted of patient's Situation, Background, Assessment and   Recommendations(SBAR).     Information from the following report(s) Nurse Handoff Report, ED Encounter Summary, ED SBAR, and Recent Results was reviewed with the receiving nurse.    [place] Fall Assessment:    Presents to emergency department  because of falls (Syncope, seizure, or loss of consciousness): No  Age > 70: No  Altered Mental Status, Intoxication with alcohol or substance confusion (Disorientation, impaired judgment, poor safety awaremess, or inability to follow instructions): No  Impaired Mobility: Ambulates or transfers with assistive devices or assistance; Unable to ambulate or transer.: No  Nursing Judgement: No          Lines:       Opportunity for questions and clarification was provided.      Patient transported with:  Security

## 2024-06-12 NOTE — PLAN OF CARE
Problem: Self Harm/Suicidality  Goal: Will have no self-injury during hospital stay  Description: INTERVENTIONS:  1.  Ensure constant observer at bedside with Q15M safety checks  2.  Maintain a safe environment  3.  Secure patient belongings  4.  Ensure family/visitors adhere to safety recommendations  5.  Ensure safety tray has been added to patient's diet order  6.  Every shift and PRN: Re-assess suicidal risk via Frequent Screener    6/12/2024 1001 by Leslie Gunter RN  Outcome: Progressing  6/11/2024 2354 by Yane Chirinos RN  Outcome: Progressing     Problem: Depression  Goal: Will be euthymic at discharge  Description: INTERVENTIONS:  1. Administer medication as ordered  2. Provide emotional support via 1:1 interaction with staff  3. Encourage involvement in milieu/groups/activities  4. Monitor for social isolation  Outcome: Progressing     Problem: Behavior  Goal: Pt/Family maintain appropriate behavior and adhere to behavioral management agreement, if implemented  Description: INTERVENTIONS:  1. Assess patient/family's coping skills and  non-compliant behavior (including use of illegal substances)  2. Notify security of behavior or suspected illegal substances which indicate the need for search of the family and/or belongings  3. Encourage verbalization of thoughts and concerns in a socially appropriate manner  4. Utilize positive, consistent limit setting strategies supporting safety of patient, staff and others  5. Encourage participation in the decision making process about the behavioral management agreement  6. If a visitor's behavior poses a threat to safety call refer to organization policy.  7. Initiate consult with , Psychosocial CNS, Spiritual Care as appropriate  6/12/2024 1001 by Leslie Gunter RN  Outcome: Progressing  6/11/2024 2354 by Yane Chirinos RN  Outcome: Progressing

## 2024-06-12 NOTE — BH NOTE
Pt received laying in bed quietly. A&Ox3, disoriented to situation. Asked staff when she can leave, agreed to wait for treatment team to discuss discharge planning. No apparent distress noted. Affect flat, eye contact poor. Pt would not verbally engage further with writer. Shook head \"no\" to all assessment questions. Denies SI/HI/AVH. Pt appears distracted and preoccupied. Remains withdrawn and guarded. Q15 minute rounds maintained for safety.

## 2024-06-12 NOTE — BH NOTE
Patient appears sad but cooperative with the admission process. She currently denies suicidal ideation and is able to verbalize that she is here to get help. Per patient,she has support from her \"[place] family\". No concerning behavior noted . When educated to reach out to staff in case of active thoughts, patient verbalizes understanding. Provider notified of CSSR screening results and patient's presentation. Orders received to discontinue the constant observation and suicide precautions and to continue with q15 checks.

## 2024-06-12 NOTE — DISCHARGE INSTRUCTIONS
DISCHARGE SUMMARY    NAME:Nancie Cespedes  : 1990  MRN: [id number]    The patient Nancie Cespedes exhibits the ability to control behavior in a less restrictive environment.  Patient's level of functioning is improving.  No assaultive/destructive behavior has been observed for the past 24 hours.  No suicidal/homicidal threat or behavior has been observed for the past 24 hours.  There is no evidence of serious medication side effects.  Patient has not been in physical or protective restraints for at least the past 24 hours.    If weapons involved, how are they secured? No access    Is patient aware of and in agreement with discharge plan? Yes    Arrangements for medication:  Prescriptions sent to pharmacy     Copy of discharge instructions to provider?:  Yes    Arrangements for transportation home:  Roundtrip    Keep all follow up appointments as scheduled, continue to take prescribed medications per physician instructions.  Mental health crisis number:  911 or your local mental health crisis line number at [phone]      Mental Health Emergency WARM LINE      [phone] ([phone])      M-F: 9am to 9pm      Sat & Sun: 5pm - 9pm  National suicide prevention lines:                             5-452-KMMJIQF (4-611-068-7944)       1-028-894-TALK ([phone])    Crisis Text Line:  Text HOME to 385079

## 2024-06-12 NOTE — BH NOTE
Patient arrived on the unit from [facility] ED at 2232 with the chief complains of S.I with the plan to jump off the bridge. Patient had a break down at work today due to her relationship issues. Admitting diagnosis is  Schizoaffective disorder. Patient had been to this unit in the past . Has history of being violent with the police while being hospitalized in 2023 at [facility]. Patient had been non compliant with  the long acting IM medication.     She appeared sad  and withdrawn. However, she was cooperative with the admission process. She currently denies S.I/H.I/A/V/H, anxiety and depression. But she does admits hearing negative voices before admission. Alert and oriented x 4. Insight present. No concerning behavior noted. UDS was negative. BAL >10. Patient was oriented to the unit. Admission packet given.

## 2024-06-12 NOTE — PLAN OF CARE
Problem: Self Harm/Suicidality  Goal: Will have no self-injury during hospital stay  Description: INTERVENTIONS:  1.  Ensure constant observer at bedside with Q15M safety checks  2.  Maintain a safe environment  3.  Secure patient belongings  4.  Ensure family/visitors adhere to safety recommendations  5.  Ensure safety tray has been added to patient's diet order  6.  Every shift and PRN: Re-assess suicidal risk via Frequent Screener    Outcome: Progressing     Problem: Behavior  Goal: Pt/Family maintain appropriate behavior and adhere to behavioral management agreement, if implemented  Description: INTERVENTIONS:  1. Assess patient/family's coping skills and  non-compliant behavior (including use of illegal substances)  2. Notify security of behavior or suspected illegal substances which indicate the need for search of the family and/or belongings  3. Encourage verbalization of thoughts and concerns in a socially appropriate manner  4. Utilize positive, consistent limit setting strategies supporting safety of patient, staff and others  5. Encourage participation in the decision making process about the behavioral management agreement  6. If a visitor's behavior poses a threat to safety call refer to organization policy.  7. Initiate consult with , Psychosocial CNS, Spiritual Care as appropriate  Outcome: Progressing

## 2024-06-12 NOTE — BSMART NOTE
Patient has been accepted to [facility] 316-01 by WILFRED Wisdom on behalf of Dr Landeros.   
shows no evidence of impairment.  The patient's perception demonstrated changes in the following:  auditory  hallucinations. The patient's memory shows no evidence of impairment.  The patient's appetite shows no evidence of impairment .  The patient's sleep shows no evidence of impairment. The patient's insight shows no evidence of impairment.  The patient's judgement shows no evidence of impairment.      Section V - Substance Abuse  The patient is not using substances.        Section VI - Living Arrangements  The patient Single.  The patient lives alone. The patient has no children.  The patient does plan to return home upon discharge.  The patient does not have legal issues pending. The patient's source of income comes from employment.  Oriental orthodox and cultural practices have not been voiced at this time.    The patient's greatest support comes from [facility] and this person will be involved with the treatment.    The patient has not been in an event described as horrible or outside the realm of ordinary life experience either currently or in the past.  The patient has not been a victim of sexual/physical abuse.    Section VII - Other Areas of Clinical Concern  The highest grade achieved is college with the overall quality of school experience being described as ok.  The patient is currently employed and speaks English as a primary language.  The patient has no communication impairments affecting communication. The patient's preference for learning can be described as: can read and write adequately.  The patient's hearing is normal.  The patient's vision is normal.      Jose Zhang, LPC

## 2024-06-12 NOTE — BH NOTE
Behavioral Health Transition Record to Provider    Patient Name: Nancie Cespedes  YOB: 1990  Medical Record Number: [id number]  Date of Admission: 6/11/2024  Date of Discharge: 6/12/2024    Attending Provider: Joaquin Tejeda, *  Discharging Provider: Joaquin Tejeda MD  To contact this individual call [phone] and ask the  to page.  If unavailable, ask to be transferred to Behavioral Health Provider on call.  A Behavioral Health Provider will be available on call 24/7 and during holidays.    Primary Care Provider: Loreto Delvalle APRN - NP    No Known Allergies    Reason for Admission: SI with plan    Admission Diagnosis: Schizoaffective disorder (HCC) [F25.9]    * No surgery found *    Results for orders placed or performed during the hospital encounter of 06/11/24   CBC with Auto Differential   Result Value Ref Range    WBC 8.2 3.6 - 11.0 K/uL    RBC 4.52 3.80 - 5.20 M/uL    Hemoglobin 11.2 (L) 11.5 - 16.0 g/dL    Hematocrit 36.6 35.0 - 47.0 %    MCV 81.0 80.0 - 99.0 FL    MCH 24.8 (L) 26.0 - 34.0 PG    MCHC 30.6 30.0 - 36.5 g/dL    RDW 15.5 (H) 11.5 - 14.5 %    Platelets 278 150 - 400 K/uL    MPV 11.2 8.9 - 12.9 FL    Nucleated RBCs 0.0 0  WBC    nRBC 0.00 0.00 - 0.01 K/uL    Neutrophils % 51 32 - 75 %    Lymphocytes % 40 12 - 49 %    Monocytes % 8 5 - 13 %    Eosinophils % 1 0 - 7 %    Basophils % 0 0 - 1 %    Immature Granulocytes % 0 0.0 - 0.5 %    Neutrophils Absolute 4.1 1.8 - 8.0 K/UL    Lymphocytes Absolute 3.3 0.8 - 3.5 K/UL    Monocytes Absolute 0.7 0.0 - 1.0 K/UL    Eosinophils Absolute 0.1 0.0 - 0.4 K/UL    Basophils Absolute 0.0 0.0 - 0.1 K/UL    Immature Granulocytes Absolute 0.0 0.00 - 0.04 K/UL    Differential Type AUTOMATED     Comprehensive Metabolic Panel   Result Value Ref Range    Sodium 137 136 - 145 mmol/L    Potassium 3.9 3.5 - 5.1 mmol/L    Chloride 103 97 - 108 mmol/L    CO2 29 21 - 32 mmol/L    Anion Gap 5 5 - 15 mmol/L    Glucose 116 (H) 65

## 2024-06-12 NOTE — BH NOTE
Pt compliant with restarted medications. Pt observed walking briskly in hallway and appears to be perspiration. Pt reports she \"fine\" and is exercising. Declined needing assistance or PRN medication. Continues to pace in hallways at this time.

## 2024-06-13 NOTE — DISCHARGE SUMMARY
PSYCHIATRIC DISCHARGE SUMMARY         IDENTIFICATION:    Patient Name  Nancie Cespedes   Date of Birth 1990   [facility] [id number]   Medical Record Number  [id number]      Age  33 y.o.   PCP Loreto Delvalle APRN - NP   Admit date:  6/11/2024    Discharge date: 6/12/2024   Room Number  316/01  @ [facility]   Date of Service  6/12/2024            TYPE OF DISCHARGE: REGULAR               CONDITION AT DISCHARGE: Fair       PROVISIONAL & DISCHARGE DIAGNOSES:        Active Hospital Problems    *Schizoaffective disorder (HCC)        DISCHARGE DIAGNOSIS:   Axis I:  SEE ABOVE  Axis II: SEE ABOVE  Axis III: SEE ABOVE  Axis IV:  lack of structure  Axis V:  10 on admission, 55 on discharge     HISTORY OF PRESENT ILLNESS:  \"Suicidal ideation\"    The patient, Nancie Cespedes, is a 33 y.o.  Black /  female with a past psychiatric history significant for schizoaffective disorder who presents at this time with complaints of (and/or evidence of) the following emotional symptoms: depression and suicidal thoughts/threats.  Additional symptomatology include mood lability.  The above symptoms have been present for 2+ weeks. These symptoms are of moderate to high severity. These symptoms are constant in nature.  The patient's condition has been precipitated by psychosocial stressors. UDS: negative; BAL=0.     The patient presented with SI in the setting of social stressors. Patient states that she was having work stressors due to a training and a recent breakup which preceded some of her symptoms. She came to the hospital for help and is visible in the milieu.     The patient is a fair historian. The patient corroborates the above narrative. The patient contracts for safety on the unit and gives consent for the team to contact collateral. The patient is amenable to initiating treatment while on the unit.        HOSPITALIZATION COURSE:    Nancie Cespedes was admitted to the inpatient psychiatric unit [place]

## 2024-07-24 ENCOUNTER — TELEMEDICINE (OUTPATIENT)
Facility: CLINIC | Age: 34
End: 2024-07-24

## 2024-07-24 DIAGNOSIS — Z91.199 NO-SHOW FOR APPOINTMENT: Primary | ICD-10-CM

## 2024-07-24 NOTE — PROGRESS NOTES
Pt is here for   Chief Complaint   Patient presents with    Follow-up     MH/SI     \"Have you been to the ER, urgent care clinic since your last visit?  Hospitalized since your last visit?\"    NO    “Have you seen or consulted any other health care providers outside of [facility] since your last visit?”    NO     “Have you had a pap smear?”    NO    No cervical cancer screening on file             Click Here for Release of Records Request

## 2024-07-24 NOTE — PROGRESS NOTES
Pt sent 3 video visit invites. No connection. Called # on file, no answer. LVM to connect ASAP to keep appt. If still has not connected over next 5-10 min, will need to reschedule for OV alternatively.

## 2024-07-31 ENCOUNTER — TELEMEDICINE (OUTPATIENT)
Facility: CLINIC | Age: 34
End: 2024-07-31
Payer: COMMERCIAL

## 2024-07-31 DIAGNOSIS — Z56.6 WORK-RELATED STRESS: ICD-10-CM

## 2024-07-31 DIAGNOSIS — F25.1 SCHIZOAFFECTIVE DISORDER, DEPRESSIVE TYPE (HCC): Primary | ICD-10-CM

## 2024-07-31 DIAGNOSIS — Z02.9 ADMINISTRATIVE ENCOUNTER: ICD-10-CM

## 2024-07-31 PROCEDURE — 99214 OFFICE O/P EST MOD 30 MIN: CPT | Performed by: NURSE PRACTITIONER

## 2024-07-31 RX ORDER — ARIPIPRAZOLE 20 MG/1
20 TABLET ORAL DAILY
Qty: 30 TABLET | Refills: 1 | Status: SHIPPED | OUTPATIENT
Start: 2024-07-31 | End: 2024-09-29

## 2024-07-31 SDOH — HEALTH STABILITY - MENTAL HEALTH: OTHER PHYSICAL AND MENTAL STRAIN RELATED TO WORK: Z56.6

## 2024-07-31 ASSESSMENT — PATIENT HEALTH QUESTIONNAIRE - PHQ9
4. FEELING TIRED OR HAVING LITTLE ENERGY: SEVERAL DAYS
3. TROUBLE FALLING OR STAYING ASLEEP: NOT AT ALL
SUM OF ALL RESPONSES TO PHQ QUESTIONS 1-9: 3
7. TROUBLE CONCENTRATING ON THINGS, SUCH AS READING THE NEWSPAPER OR WATCHING TELEVISION: SEVERAL DAYS
10. IF YOU CHECKED OFF ANY PROBLEMS, HOW DIFFICULT HAVE THESE PROBLEMS MADE IT FOR YOU TO DO YOUR WORK, TAKE CARE OF THINGS AT HOME, OR GET ALONG WITH OTHER PEOPLE: SOMEWHAT DIFFICULT
SUM OF ALL RESPONSES TO PHQ QUESTIONS 1-9: 3
9. THOUGHTS THAT YOU WOULD BE BETTER OFF DEAD, OR OF HURTING YOURSELF: NOT AT ALL
SUM OF ALL RESPONSES TO PHQ QUESTIONS 1-9: 3
6. FEELING BAD ABOUT YOURSELF - OR THAT YOU ARE A FAILURE OR HAVE LET YOURSELF OR YOUR FAMILY DOWN: NOT AT ALL
SUM OF ALL RESPONSES TO PHQ9 QUESTIONS 1 & 2: 0
SUM OF ALL RESPONSES TO PHQ QUESTIONS 1-9: 3
8. MOVING OR SPEAKING SO SLOWLY THAT OTHER PEOPLE COULD HAVE NOTICED. OR THE OPPOSITE, BEING SO FIGETY OR RESTLESS THAT YOU HAVE BEEN MOVING AROUND A LOT MORE THAN USUAL: SEVERAL DAYS
5. POOR APPETITE OR OVEREATING: NOT AT ALL
2. FEELING DOWN, DEPRESSED OR HOPELESS: NOT AT ALL
1. LITTLE INTEREST OR PLEASURE IN DOING THINGS: NOT AT ALL

## 2024-07-31 NOTE — PROGRESS NOTES
Nancie Cespedes is a 33 y.o. female Established patient, here for evaluation of the following chief complaint(s):   Mental Health Problem (Pt states that she has Sczhizoaffective or bipolar disorder. ) and doctors notes and work release          Assessment & Plan:   Below is the assessment and plan developed based on review of pertinent history, physical exam, labs, studies, and medications.       Diagnosis Orders   1. Schizoaffective disorder, depressive type (HCC)  ARIPiprazole ER (ABILIFY MAINTENA) 400 MG SRER    ARIPiprazole (ABILIFY) 20 MG tablet      2. Administrative encounter        3. Work-related stress            HCC Dx review: schizoaffective disorder- improved, pt with recent delusion, not suicidal. Sent for Abilify injection, pt advised to report to office for NV to have administered since fu with psych is not until late Sept. Also advised to call about counseling start. Continue Abilify 20mg daily use otherwise and zoloft 200 mg.    Specific pt instructions until next visit: call if any problems, initially planned for pt to return to one job only, but since she has already done so as one job did not require documentation to return. She has already eased back into her work stress, counseled on the potential for triggering her and advised she has her injection before she returns to work on Monday at [employer].    Current medication regimen is effective. See adjustments or prescriptions as noted above. Continue meds as prescribed.        Follow-up and Dispositions    Return for Keep appt as scheduled.             Subjective:   Nancie Cespedes is a 33 y.o. female who was seen for Mental Health Problem (Pt states that she has Sczhizoaffective or bipolar disorder. ) and doctors notes and work release      Pt is here for hospital follow-up transition of care from 6/11 to 6/12 for suicidal ideation. Diagnosed with schizoaffective disorder and suicidal. Was given med change. Instructed to schedule appt with psych,

## 2024-07-31 NOTE — PROGRESS NOTES
Pt is here for   Chief Complaint   Patient presents with    Mental Health Problem     Pt states that she has Sczhizoaffective or bipolar disorder.     doctors notes and work release     \"Have you been to the ER, urgent care clinic since your last visit?  Hospitalized since your last visit?\"    NO    “Have you seen or consulted any other health care providers outside of [facility] since your last visit?”    NO     “Have you had a pap smear?”    NO    No cervical cancer screening on file             Click Here for Release of Records Request

## 2024-08-02 ENCOUNTER — NURSE ONLY (OUTPATIENT)
Facility: CLINIC | Age: 34
End: 2024-08-02

## 2024-08-02 DIAGNOSIS — F25.1 SCHIZOAFFECTIVE DISORDER, DEPRESSIVE TYPE (HCC): Primary | ICD-10-CM

## 2024-08-02 NOTE — PROGRESS NOTES
ARIPiprazole ER (ABILIFY MAINTENA) 400 mg injection was given today for, Observed for 10 minutes. No reaction noted. Given in Right Glute, IM. Given By Eliseo Schwartz LPN on 08/02/2024 at 10:34am     NDC Number 20121-846-23  Lot Number [id number]  Expires 10/31/2026  ARIPiprazole ER (ABILIFY MAINTENA) 400 mg injection

## 2024-08-31 ENCOUNTER — HOSPITAL ENCOUNTER (EMERGENCY)
Facility: HOSPITAL | Age: 34
Discharge: HOME | End: 2024-08-31
Payer: COMMERCIAL

## 2024-08-31 VITALS
RESPIRATION RATE: 18 BRPM | SYSTOLIC BLOOD PRESSURE: 160 MMHG | HEIGHT: 63 IN | WEIGHT: 280 LBS | BODY MASS INDEX: 49.61 KG/M2 | TEMPERATURE: 96.4 F | DIASTOLIC BLOOD PRESSURE: 94 MMHG | HEART RATE: 85 BPM | OXYGEN SATURATION: 95 %

## 2024-08-31 DIAGNOSIS — R53.83 OTHER FATIGUE: Primary | ICD-10-CM

## 2024-08-31 LAB
ALBUMIN SERPL-MCNC: 3.3 G/DL (ref 3.5–5)
ALBUMIN/GLOB SERPL: 0.8 (ref 1.1–2.2)
ALP SERPL-CCNC: 57 U/L (ref 45–117)
ALT SERPL-CCNC: 21 U/L (ref 12–78)
ANION GAP SERPL CALC-SCNC: 8 MMOL/L (ref 5–15)
APPEARANCE UR: CLEAR
AST SERPL-CCNC: 16 U/L (ref 15–37)
BACTERIA URNS QL MICRO: NEGATIVE /HPF
BASOPHILS # BLD: 0 K/UL (ref 0–0.1)
BASOPHILS NFR BLD: 0 % (ref 0–1)
BILIRUB SERPL-MCNC: 0.2 MG/DL (ref 0.2–1)
BILIRUB UR QL: NEGATIVE
BUN SERPL-MCNC: 12 MG/DL (ref 6–20)
BUN/CREAT SERPL: 11 (ref 12–20)
CALCIUM SERPL-MCNC: 8.6 MG/DL (ref 8.5–10.1)
CHLORIDE SERPL-SCNC: 105 MMOL/L (ref 97–108)
CO2 SERPL-SCNC: 28 MMOL/L (ref 21–32)
COLOR UR: NORMAL
CREAT SERPL-MCNC: 1.06 MG/DL (ref 0.55–1.02)
DIFFERENTIAL METHOD BLD: ABNORMAL
EOSINOPHIL # BLD: 0.1 K/UL (ref 0–0.4)
EOSINOPHIL NFR BLD: 1 % (ref 0–7)
EPITH CASTS URNS QL MICRO: NORMAL /LPF
ERYTHROCYTE [DISTWIDTH] IN BLOOD BY AUTOMATED COUNT: 15.5 % (ref 11.5–14.5)
GLOBULIN SER CALC-MCNC: 4 G/DL (ref 2–4)
GLUCOSE SERPL-MCNC: 97 MG/DL (ref 65–100)
GLUCOSE UR STRIP.AUTO-MCNC: NEGATIVE MG/DL
HCT VFR BLD AUTO: 35.2 % (ref 35–47)
HGB BLD-MCNC: 10.8 G/DL (ref 11.5–16)
HGB UR QL STRIP: NEGATIVE
IMM GRANULOCYTES # BLD AUTO: 0 K/UL (ref 0–0.04)
IMM GRANULOCYTES NFR BLD AUTO: 0 % (ref 0–0.5)
KETONES UR QL STRIP.AUTO: NEGATIVE MG/DL
LEUKOCYTE ESTERASE UR QL STRIP.AUTO: NEGATIVE
LYMPHOCYTES # BLD: 2.4 K/UL (ref 0.8–3.5)
LYMPHOCYTES NFR BLD: 34 % (ref 12–49)
MCH RBC QN AUTO: 25.2 PG (ref 26–34)
MCHC RBC AUTO-ENTMCNC: 30.7 G/DL (ref 30–36.5)
MCV RBC AUTO: 82.1 FL (ref 80–99)
MONOCYTES # BLD: 0.6 K/UL (ref 0–1)
MONOCYTES NFR BLD: 8 % (ref 5–13)
NEUTS SEG # BLD: 4 K/UL (ref 1.8–8)
NEUTS SEG NFR BLD: 57 % (ref 32–75)
NITRITE UR QL STRIP.AUTO: NEGATIVE
NRBC # BLD: 0 K/UL (ref 0–0.01)
NRBC BLD-RTO: 0 PER 100 WBC
PH UR STRIP: 6 (ref 5–8)
PLATELET # BLD AUTO: 276 K/UL (ref 150–400)
PMV BLD AUTO: 11 FL (ref 8.9–12.9)
POTASSIUM SERPL-SCNC: 3.5 MMOL/L (ref 3.5–5.1)
PROT SERPL-MCNC: 7.3 G/DL (ref 6.4–8.2)
PROT UR STRIP-MCNC: NEGATIVE MG/DL
RBC # BLD AUTO: 4.29 M/UL (ref 3.8–5.2)
RBC #/AREA URNS HPF: NORMAL /HPF (ref 0–5)
SODIUM SERPL-SCNC: 141 MMOL/L (ref 136–145)
SP GR UR REFRACTOMETRY: 1.01
URINE CULTURE IF INDICATED: NORMAL
UROBILINOGEN UR QL STRIP.AUTO: 1 EU/DL (ref 0.2–1)
WBC # BLD AUTO: 7.1 K/UL (ref 3.6–11)
WBC URNS QL MICRO: NORMAL /HPF (ref 0–4)

## 2024-08-31 PROCEDURE — 99283 EMERGENCY DEPT VISIT LOW MDM: CPT

## 2024-08-31 PROCEDURE — 85025 COMPLETE CBC W/AUTO DIFF WBC: CPT

## 2024-08-31 PROCEDURE — 36415 COLL VENOUS BLD VENIPUNCTURE: CPT

## 2024-08-31 PROCEDURE — 80053 COMPREHEN METABOLIC PANEL: CPT

## 2024-08-31 PROCEDURE — 81001 URINALYSIS AUTO W/SCOPE: CPT

## 2024-08-31 ASSESSMENT — PAIN - FUNCTIONAL ASSESSMENT: PAIN_FUNCTIONAL_ASSESSMENT: NONE - DENIES PAIN

## 2024-08-31 ASSESSMENT — LIFESTYLE VARIABLES
HOW OFTEN DO YOU HAVE A DRINK CONTAINING ALCOHOL: MONTHLY OR LESS
HOW MANY STANDARD DRINKS CONTAINING ALCOHOL DO YOU HAVE ON A TYPICAL DAY: 1 OR 2

## 2024-08-31 ASSESSMENT — ENCOUNTER SYMPTOMS
ABDOMINAL PAIN: 0
BACK PAIN: 0
SHORTNESS OF BREATH: 0

## 2024-08-31 NOTE — ED PROVIDER NOTES
Negative NEG      Urobilinogen, Urine 1.0 0.2 - 1.0 EU/dL    Nitrite, Urine Negative NEG      Leukocyte Esterase, Urine Negative NEG      WBC, UA 0-4 0 - 4 /hpf    RBC, UA 0-5 0 - 5 /hpf    Epithelial Cells, UA FEW FEW /lpf    BACTERIA, URINE Negative NEG /hpf    Urine Culture if Indicated CULTURE NOT INDICATED BY UA RESULT CNI         EKG: When ordered, EKG's are interpreted by the Emergency Department Physician in the absence of a cardiologist.  Please see their note for interpretation of EKG.      RADIOLOGY:  Non-plain film images such as CT, Ultrasound and MRI are read by the radiologist. Plain radiographic images are visualized and preliminarily interpreted by the ED Provider with the below findings:          Interpretation per the Radiologist below, if available at the time of this note:     No orders to display        PROCEDURES   Unless otherwise noted below, none  Procedures     CRITICAL CARE TIME       EMERGENCY DEPARTMENT COURSE and DIFFERENTIAL DIAGNOSIS/MDM   Vitals:    Vitals:    08/31/24 0906   BP: (!) 160/94   Pulse: 85   Resp: 18   Temp: (!) 96.4 °F (35.8 °C)   TempSrc: Tympanic   SpO2: 95%   Weight: 127 kg (280 lb)   Height: 1.6 m (5' 3\")        Patient was given the following medications:  Medications - No data to display    CONSULTS: (Who and What was discussed)  None    Chronic Conditions: Schizoaffective disorder history of depression chronic fatigue chronic knee pain vitamin D deficiency    ADDITIONAL CONSIDERATIONS:  None    RECORDS REVIEWED: Nursing Notes    CC/HPI Summary, DDx, ED Course, and Reassessment: 33-year-old female presents with fatigue states it is chronic.  States she works nights.  Also reports she is feeling depressed denies SI HI.  Denies heavy periods denies dizziness headache urinary symptoms heavy periods.  Discussed with patient regarding order labs patient presented for return to work note patient agreed to basic labs urinalysis patient does have PCP for follow-up.          Disposition Considerations (Tests not done, Shared Decision Making, Pt Expectation of Test or Tx.):      FINAL IMPRESSION     1. Other fatigue          DISPOSITION/PLAN   DISPOSITION Decision To Discharge 08/31/2024 10:48:51 AM  Condition at Disposition: Stable      Discharge Note: The patient is stable for discharge home. The signs, symptoms, diagnosis, and discharge instructions have been discussed, understanding conveyed, and agreed upon. The patient is to follow up as recommended or return to ER should their symptoms worsen.      PATIENT REFERRED TO:  Loreto Delvalle APRN - NP  [address] [address] [phone]    In 1 week         DISCHARGE MEDICATIONS:     Medication List        ASK your doctor about these medications      * ARIPiprazole  MG Srer  Commonly known as: ABILIFY MAINTENA  Inject 400 mg into the muscle every 28 days     * ARIPiprazole 20 MG tablet  Commonly known as: ABILIFY  Take 1 tablet by mouth daily for 60 doses     sertraline 100 MG tablet  Commonly known as: ZOLOFT  Take 2 tablets by mouth daily           * This list has 2 medication(s) that are the same as other medications prescribed for you. Read the directions carefully, and ask your doctor or other care provider to review them with you.                    DISCONTINUED MEDICATIONS:  Discharge Medication List as of 8/31/2024 10:49 AM          I have seen and evaluated the patient autonomously. My supervision physician was on site and available for consultation if needed.     I am the Primary Clinician of Record.   JYOTHI Harman NP (electronically signed)    (Please note that parts of this dictation were completed with voice recognition software. Quite often unanticipated grammatical, syntax, homophones, and other interpretive errors are inadvertently transcribed by the computer software. Please disregards these errors. Please excuse any errors that have escaped final proofreading.)         Kathleen Talamantes, APRN - NP  08/31/24 1148

## 2024-08-31 NOTE — ED NOTES
Pt presents ambulatory to ED requesting a work note due to increase depression and fatigue. She denies SI/HI, AVH. Pt reports having a meeting with her counselor at the end of September. Pt denies any pain at the time of assessment. Pt is alert and oriented x 4, RR even and unlabored, skin is warm and dry. Assessment completed and pt updated on plan of care.        Emergency Department Nursing Plan of Care        The Nursing Plan of Care is developed from the Nursing assessment and Emergency Department Attending provider initial evaluation.  The plan of care may be reviewed in the “ED Provider note”.

## 2024-08-31 NOTE — ED NOTES
Discharge instructions were given to the patient by WILFRED Wang.     The patient left the Emergency Department ambulatory, alert and oriented and in no acute distress with 0 prescriptions. The patient was encouraged to call or return to the ED for worsening issues or problems and was encouraged to schedule a follow up appointment for continuing care. Patient discharged home ambulatory with a steady gait.    The patient verbalized understanding of discharge instructions and prescriptions, all questions were answered. The patient has no further concerns at this time.

## 2024-09-16 ENCOUNTER — OFFICE VISIT (OUTPATIENT)
Facility: CLINIC | Age: 34
End: 2024-09-16
Payer: COMMERCIAL

## 2024-09-16 VITALS
HEIGHT: 63 IN | SYSTOLIC BLOOD PRESSURE: 125 MMHG | WEIGHT: 281.8 LBS | TEMPERATURE: 96.8 F | RESPIRATION RATE: 18 BRPM | BODY MASS INDEX: 49.93 KG/M2 | HEART RATE: 85 BPM | DIASTOLIC BLOOD PRESSURE: 77 MMHG | OXYGEN SATURATION: 96 %

## 2024-09-16 DIAGNOSIS — G47.10 HYPERSOMNIA: Primary | ICD-10-CM

## 2024-09-16 DIAGNOSIS — R06.83 SNORING: ICD-10-CM

## 2024-09-16 PROCEDURE — 99214 OFFICE O/P EST MOD 30 MIN: CPT | Performed by: NURSE PRACTITIONER

## 2024-09-16 RX ORDER — MODAFINIL 100 MG/1
100 TABLET ORAL DAILY
Qty: 30 TABLET | Refills: 0 | Status: SHIPPED | OUTPATIENT
Start: 2024-09-16 | End: 2024-10-16

## 2024-09-17 ENCOUNTER — TELEPHONE (OUTPATIENT)
Age: 34
End: 2024-09-17

## 2024-09-19 ENCOUNTER — CLINICAL DOCUMENTATION (OUTPATIENT)
Facility: CLINIC | Age: 34
End: 2024-09-19

## 2024-09-23 ENCOUNTER — CLINICAL DOCUMENTATION (OUTPATIENT)
Facility: CLINIC | Age: 34
End: 2024-09-23

## 2024-12-04 ENCOUNTER — TELEMEDICINE (OUTPATIENT)
Facility: CLINIC | Age: 34
End: 2024-12-04

## 2024-12-04 DIAGNOSIS — Z91.199 NO-SHOW FOR APPOINTMENT: Primary | ICD-10-CM

## 2024-12-04 DIAGNOSIS — F25.1 SCHIZOAFFECTIVE DISORDER, DEPRESSIVE TYPE (HCC): Primary | ICD-10-CM

## 2024-12-04 SDOH — ECONOMIC STABILITY: INCOME INSECURITY: HOW HARD IS IT FOR YOU TO PAY FOR THE VERY BASICS LIKE FOOD, HOUSING, MEDICAL CARE, AND HEATING?: PATIENT DECLINED

## 2024-12-04 SDOH — ECONOMIC STABILITY: TRANSPORTATION INSECURITY
IN THE PAST 12 MONTHS, HAS LACK OF TRANSPORTATION KEPT YOU FROM MEETINGS, WORK, OR FROM GETTING THINGS NEEDED FOR DAILY LIVING?: PATIENT DECLINED

## 2024-12-04 SDOH — ECONOMIC STABILITY: FOOD INSECURITY: WITHIN THE PAST 12 MONTHS, YOU WORRIED THAT YOUR FOOD WOULD RUN OUT BEFORE YOU GOT MONEY TO BUY MORE.: PATIENT DECLINED

## 2024-12-04 SDOH — ECONOMIC STABILITY: FOOD INSECURITY: WITHIN THE PAST 12 MONTHS, THE FOOD YOU BOUGHT JUST DIDN'T LAST AND YOU DIDN'T HAVE MONEY TO GET MORE.: PATIENT DECLINED

## 2024-12-04 NOTE — PROGRESS NOTES
Pt sent 3 video visit invites. No connection. Needs to reschedule for OV alternatively.     Visit not needed, pt will msg provider instead.

## 2025-04-24 ENCOUNTER — HOSPITAL ENCOUNTER (EMERGENCY)
Facility: HOSPITAL | Age: 35
Discharge: HOME | End: 2025-04-24
Attending: EMERGENCY MEDICINE
Payer: COMMERCIAL

## 2025-04-24 VITALS
HEIGHT: 63 IN | BODY MASS INDEX: 50.94 KG/M2 | HEART RATE: 88 BPM | WEIGHT: 287.48 LBS | SYSTOLIC BLOOD PRESSURE: 152 MMHG | OXYGEN SATURATION: 98 % | DIASTOLIC BLOOD PRESSURE: 86 MMHG | RESPIRATION RATE: 14 BRPM | TEMPERATURE: 98.1 F

## 2025-04-24 DIAGNOSIS — R19.7 DIARRHEA, UNSPECIFIED TYPE: Primary | ICD-10-CM

## 2025-04-24 LAB
ALBUMIN SERPL-MCNC: 3.3 G/DL (ref 3.5–5)
ALBUMIN/GLOB SERPL: 0.9 (ref 1.1–2.2)
ALP SERPL-CCNC: 52 U/L (ref 45–117)
ALT SERPL-CCNC: 29 U/L (ref 12–78)
ANION GAP SERPL CALC-SCNC: 6 MMOL/L (ref 2–12)
APPEARANCE UR: CLEAR
AST SERPL-CCNC: 30 U/L (ref 15–37)
BACTERIA URNS QL MICRO: NEGATIVE /HPF
BASOPHILS # BLD: 0.03 K/UL (ref 0–0.1)
BASOPHILS NFR BLD: 0.4 % (ref 0–1)
BILIRUB SERPL-MCNC: 0.2 MG/DL (ref 0.2–1)
BILIRUB UR QL: NEGATIVE
BUN SERPL-MCNC: 15 MG/DL (ref 6–20)
BUN/CREAT SERPL: 15 (ref 12–20)
CALCIUM SERPL-MCNC: 9.1 MG/DL (ref 8.5–10.1)
CHLORIDE SERPL-SCNC: 107 MMOL/L (ref 97–108)
CO2 SERPL-SCNC: 29 MMOL/L (ref 21–32)
COLOR UR: NORMAL
CREAT SERPL-MCNC: 1 MG/DL (ref 0.55–1.02)
DIFFERENTIAL METHOD BLD: ABNORMAL
EOSINOPHIL # BLD: 0.1 K/UL (ref 0–0.4)
EOSINOPHIL NFR BLD: 1.5 % (ref 0–7)
EPITH CASTS URNS QL MICRO: NORMAL /LPF
ERYTHROCYTE [DISTWIDTH] IN BLOOD BY AUTOMATED COUNT: 15.3 % (ref 11.5–14.5)
GLOBULIN SER CALC-MCNC: 3.7 G/DL (ref 2–4)
GLUCOSE BLD STRIP.AUTO-MCNC: 120 MG/DL (ref 65–117)
GLUCOSE SERPL-MCNC: 93 MG/DL (ref 65–100)
GLUCOSE UR STRIP.AUTO-MCNC: NEGATIVE MG/DL
HCG UR QL: NEGATIVE
HCT VFR BLD AUTO: 35.2 % (ref 35–47)
HGB BLD-MCNC: 11.1 G/DL (ref 11.5–16)
HGB UR QL STRIP: NEGATIVE
IMM GRANULOCYTES # BLD AUTO: 0.02 K/UL (ref 0–0.04)
IMM GRANULOCYTES NFR BLD AUTO: 0.3 % (ref 0–0.5)
KETONES UR QL STRIP.AUTO: NEGATIVE MG/DL
LEUKOCYTE ESTERASE UR QL STRIP.AUTO: NEGATIVE
LYMPHOCYTES # BLD: 2.65 K/UL (ref 0.8–3.5)
LYMPHOCYTES NFR BLD: 39 % (ref 12–49)
MCH RBC QN AUTO: 25.7 PG (ref 26–34)
MCHC RBC AUTO-ENTMCNC: 31.5 G/DL (ref 30–36.5)
MCV RBC AUTO: 81.5 FL (ref 80–99)
MONOCYTES # BLD: 0.49 K/UL (ref 0–1)
MONOCYTES NFR BLD: 7.2 % (ref 5–13)
NEUTS SEG # BLD: 3.5 K/UL (ref 1.8–8)
NEUTS SEG NFR BLD: 51.6 % (ref 32–75)
NITRITE UR QL STRIP.AUTO: NEGATIVE
NRBC # BLD: 0 K/UL (ref 0–0.01)
NRBC BLD-RTO: 0 PER 100 WBC
PH UR STRIP: 6 (ref 5–8)
PLATELET # BLD AUTO: 268 K/UL (ref 150–400)
PMV BLD AUTO: 11.6 FL (ref 8.9–12.9)
POTASSIUM SERPL-SCNC: 3.8 MMOL/L (ref 3.5–5.1)
PROT SERPL-MCNC: 7 G/DL (ref 6.4–8.2)
PROT UR STRIP-MCNC: NEGATIVE MG/DL
RBC # BLD AUTO: 4.32 M/UL (ref 3.8–5.2)
RBC #/AREA URNS HPF: NORMAL /HPF (ref 0–5)
SERVICE CMNT-IMP: ABNORMAL
SODIUM SERPL-SCNC: 142 MMOL/L (ref 136–145)
SP GR UR REFRACTOMETRY: 1.02
URINE CULTURE IF INDICATED: NORMAL
UROBILINOGEN UR QL STRIP.AUTO: 1 EU/DL (ref 0.2–1)
WBC # BLD AUTO: 6.8 K/UL (ref 3.6–11)
WBC URNS QL MICRO: NORMAL /HPF (ref 0–4)

## 2025-04-24 PROCEDURE — 85025 COMPLETE CBC W/AUTO DIFF WBC: CPT

## 2025-04-24 PROCEDURE — 81001 URINALYSIS AUTO W/SCOPE: CPT

## 2025-04-24 PROCEDURE — 99283 EMERGENCY DEPT VISIT LOW MDM: CPT

## 2025-04-24 PROCEDURE — 80053 COMPREHEN METABOLIC PANEL: CPT

## 2025-04-24 PROCEDURE — 82962 GLUCOSE BLOOD TEST: CPT

## 2025-04-24 PROCEDURE — 81025 URINE PREGNANCY TEST: CPT

## 2025-04-24 PROCEDURE — 36415 COLL VENOUS BLD VENIPUNCTURE: CPT

## 2025-04-24 ASSESSMENT — PAIN - FUNCTIONAL ASSESSMENT: PAIN_FUNCTIONAL_ASSESSMENT: NONE - DENIES PAIN

## 2025-04-24 NOTE — ED TRIAGE NOTES
Pt presents with nausea and diarrhea that has become more frequent over the past x 2 weeks with no vomiting. Pt has a hx of pre-diabetes and was placed in a weight loss program over the past year, and has been taking bupropion. Pt endorses increased thirst and hunger.

## 2025-04-24 NOTE — ED PROVIDER NOTES
[facility] EMERGENCY DEPARTMENT  EMERGENCY DEPARTMENT ENCOUNTER       Pt Name: Nancie Cespedes  MRN: [id number]  Birthdate 1990  Date of evaluation: 4/24/2025  Provider: Emma Keyes MD   PCP: Loreto Delvalle APRN - NP  Note Started: 12:56 PM EDT 4/24/25     CHIEF COMPLAINT       Chief Complaint   Patient presents with    Diarrhea        HISTORY OF PRESENT ILLNESS: 1 or more elements      History From: Patient, History limited by: none     Nancie Cespedes is a 34 y.o. female who presents with a chief complaint of diarrhea       Please See MDM for Additional Details of the HPI/PMH  Nursing Notes were all reviewed and agreed with or any disagreements were addressed in the HPI.     REVIEW OF SYSTEMS        Positives and Pertinent negatives as per HPI.    PAST HISTORY     Past Medical History:  Past Medical History:   Diagnosis Date    Depression        Past Surgical History:  No past surgical history on file.    Family History:  No family history on file.    Social History:  Social History     Tobacco Use    Smoking status: Every Day     Types: E-Cigarettes     Passive exposure: Never    Smokeless tobacco: Never   Vaping Use    Vaping status: Never Used   Substance Use Topics    Alcohol use: Yes    Drug use: No       Allergies:  No Known Allergies    CURRENT MEDICATIONS      Previous Medications    ARIPIPRAZOLE (ABILIFY) 20 MG TABLET    Take 1 tablet by mouth daily for 60 doses    ARIPIPRAZOLE ER (ABILIFY MAINTENA) 400 MG SRER    Inject 400 mg into the muscle every 28 days    SERTRALINE (ZOLOFT) 100 MG TABLET    Take 2 tablets by mouth daily       SCREENINGS               No data recorded            PHYSICAL EXAM      ED Triage Vitals [04/24/25 1124]   Encounter Vitals Group      BP (!) 152/86      Systolic BP Percentile       Diastolic BP Percentile       Pulse 88      Respirations 14      Temp 98.1 °F (36.7 °C)      Temp Source Oral      SpO2 98 %      Weight - Scale 130.4 kg (287 lb 7.7 oz)         Temp: 98.1 °F (36.7 °C)   TempSrc: Oral   SpO2: 98%   Weight: 130.4 kg (287 lb 7.7 oz)   Height: 1.6 m (5' 3\")        Patient was given the following medications:  Medications - No data to display    Medical Decision Making  34-year-old female with history of depression who presents with a chief complaint of diarrhea.  She states that she has chronic issues with diarrhea but the episodes are becoming more more frequent.  She also reports she has had some intermittent tingling in her hands and feet and is worried because she has been previously told she was prediabetic.  She has not seen her primary care doctor regarding her worsening diarrhea.  She denies any abdominal pain, nausea, vomiting, chest pain, shortness of breath, fever, urinary symptoms.  She denies any recent antibiotic use.  She denies any blood in her stool.    On exam patient is overall nontoxic-appearing, hemodynamically stable, satting well on room air and afebrile.  She has no abdominal tenderness palpation exam suggest a surgical process.  Considered imaging, however given benign abdominal exam do not feel this is indicated at this time.  Differential includes dehydration, electrolyte disturbance, anemia, diabetes.    Will check basic lab work to evaluate for evidence of severe electrolyte derangements or anemia.  Will check urinalysis to evaluate for evidence of UTI          Problems Addressed:  Diarrhea, unspecified type: chronic illness or injury    Amount and/or Complexity of Data Reviewed  Labs: ordered. Decision-making details documented in ED Course.          CLINICAL MANAGEMENT TOOLS:  Not Applicable      ED Course as of 04/24/25 1256   Thu Apr 24, 2025   1256 Lab work unremarkable.  I did offer to send stool samples, however patient has not needed to have a bowel movement while in the emergency department.  She will follow-up with her primary care doctor and get one ordered as an outpatient if needed.  Advised use of Imodium as needed

## 2025-04-24 NOTE — DISCHARGE INSTRUCTIONS
Thank You!    It was a pleasure taking care of you in our Emergency Department today. We know that when you come to our Emergency Department, you are entrusting us with your health, comfort, and safety. Our physicians and nurses honor that trust, and truly appreciate the opportunity to care for you and your loved ones.      We also value your feedback. If you receive a survey about your Emergency Department experience today, please fill it out.  We care about our patients' feedback, and we listen to what you have to say.  Thank you.    Emma Keyes MD  ________________________________________________________________________  I have included a copy of your lab results and/or radiologic studies from today's visit so you can have them easily available at your follow-up visit. We hope you feel better and please do not hesitate to contact the ED if you have any questions at all!    Recent Results (from the past 12 hours)   POCT Glucose    Collection Time: 04/24/25 11:56 AM   Result Value Ref Range    POC Glucose 120 (H) 65 - 117 mg/dL    Performed by: Grant Banks EDT    CBC with Auto Differential    Collection Time: 04/24/25 11:58 AM   Result Value Ref Range    WBC 6.8 3.6 - 11.0 K/uL    RBC 4.32 3.80 - 5.20 M/uL    Hemoglobin 11.1 (L) 11.5 - 16.0 g/dL    Hematocrit 35.2 35.0 - 47.0 %    MCV 81.5 80.0 - 99.0 FL    MCH 25.7 (L) 26.0 - 34.0 PG    MCHC 31.5 30.0 - 36.5 g/dL    RDW 15.3 (H) 11.5 - 14.5 %    Platelets 268 150 - 400 K/uL    MPV 11.6 8.9 - 12.9 FL    Nucleated RBCs 0.0 0  WBC    nRBC 0.00 0.00 - 0.01 K/uL    Neutrophils % 51.6 32.0 - 75.0 %    Lymphocytes % 39.0 12.0 - 49.0 %    Monocytes % 7.2 5.0 - 13.0 %    Eosinophils % 1.5 0.0 - 7.0 %    Basophils % 0.4 0.0 - 1.0 %    Immature Granulocytes % 0.3 0.0 - 0.5 %    Neutrophils Absolute 3.50 1.80 - 8.00 K/UL    Lymphocytes Absolute 2.65 0.80 - 3.50 K/UL    Monocytes Absolute 0.49 0.00 - 1.00 K/UL    Eosinophils Absolute 0.10 0.00 - 0.40 K/UL